# Patient Record
Sex: MALE | Race: WHITE | Employment: UNEMPLOYED | URBAN - METROPOLITAN AREA
[De-identification: names, ages, dates, MRNs, and addresses within clinical notes are randomized per-mention and may not be internally consistent; named-entity substitution may affect disease eponyms.]

---

## 2021-11-29 ENCOUNTER — APPOINTMENT (OUTPATIENT)
Dept: CT IMAGING | Facility: HOSPITAL | Age: 61
DRG: 673 | End: 2021-11-29
Attending: EMERGENCY MEDICINE
Payer: MEDICAID

## 2021-11-29 ENCOUNTER — HOSPITAL ENCOUNTER (INPATIENT)
Facility: HOSPITAL | Age: 61
LOS: 8 days | Discharge: HOME | DRG: 673 | End: 2021-12-08
Attending: EMERGENCY MEDICINE | Admitting: HOSPITALIST
Payer: MEDICAID

## 2021-11-29 DIAGNOSIS — N13.30 HYDRONEPHROSIS, UNSPECIFIED HYDRONEPHROSIS TYPE: ICD-10-CM

## 2021-11-29 DIAGNOSIS — E83.51 HYPOCALCEMIA: ICD-10-CM

## 2021-11-29 DIAGNOSIS — N17.9 AKI (ACUTE KIDNEY INJURY) (HCC): ICD-10-CM

## 2021-11-29 DIAGNOSIS — R33.9 URINARY RETENTION: Primary | ICD-10-CM

## 2021-11-29 PROCEDURE — 80076 HEPATIC FUNCTION PANEL: CPT | Performed by: EMERGENCY MEDICINE

## 2021-11-29 PROCEDURE — 51702 INSERT TEMP BLADDER CATH: CPT

## 2021-11-29 PROCEDURE — 93010 ELECTROCARDIOGRAM REPORT: CPT | Performed by: EMERGENCY MEDICINE

## 2021-11-29 PROCEDURE — 87186 SC STD MICRODIL/AGAR DIL: CPT | Performed by: EMERGENCY MEDICINE

## 2021-11-29 PROCEDURE — 80048 BASIC METABOLIC PNL TOTAL CA: CPT | Performed by: EMERGENCY MEDICINE

## 2021-11-29 PROCEDURE — 81001 URINALYSIS AUTO W/SCOPE: CPT | Performed by: EMERGENCY MEDICINE

## 2021-11-29 PROCEDURE — 85025 COMPLETE CBC W/AUTO DIFF WBC: CPT | Performed by: EMERGENCY MEDICINE

## 2021-11-29 PROCEDURE — 87086 URINE CULTURE/COLONY COUNT: CPT | Performed by: EMERGENCY MEDICINE

## 2021-11-29 PROCEDURE — 93005 ELECTROCARDIOGRAM TRACING: CPT

## 2021-11-29 PROCEDURE — 83735 ASSAY OF MAGNESIUM: CPT | Performed by: INTERNAL MEDICINE

## 2021-11-29 PROCEDURE — 96361 HYDRATE IV INFUSION ADD-ON: CPT

## 2021-11-29 PROCEDURE — 96366 THER/PROPH/DIAG IV INF ADDON: CPT

## 2021-11-29 PROCEDURE — 96365 THER/PROPH/DIAG IV INF INIT: CPT

## 2021-11-29 PROCEDURE — 99285 EMERGENCY DEPT VISIT HI MDM: CPT

## 2021-11-29 PROCEDURE — 87077 CULTURE AEROBIC IDENTIFY: CPT | Performed by: EMERGENCY MEDICINE

## 2021-11-29 PROCEDURE — 74176 CT ABD & PELVIS W/O CONTRAST: CPT | Performed by: EMERGENCY MEDICINE

## 2021-11-29 RX ORDER — SODIUM CHLORIDE 9 MG/ML
INJECTION, SOLUTION INTRAVENOUS CONTINUOUS
Status: DISCONTINUED | OUTPATIENT
Start: 2021-11-29 | End: 2021-11-30

## 2021-11-30 ENCOUNTER — APPOINTMENT (OUTPATIENT)
Dept: GENERAL RADIOLOGY | Facility: HOSPITAL | Age: 61
DRG: 673 | End: 2021-11-30
Attending: HOSPITALIST
Payer: MEDICAID

## 2021-11-30 PROBLEM — N17.9 AKI (ACUTE KIDNEY INJURY) (HCC): Status: ACTIVE | Noted: 2021-11-30

## 2021-11-30 PROBLEM — N13.30 HYDRONEPHROSIS, UNSPECIFIED HYDRONEPHROSIS TYPE: Status: ACTIVE | Noted: 2021-11-30

## 2021-11-30 PROBLEM — E83.51 HYPOCALCEMIA: Status: ACTIVE | Noted: 2021-11-30

## 2021-11-30 PROBLEM — N17.9 AKI (ACUTE KIDNEY INJURY): Status: ACTIVE | Noted: 2021-11-30

## 2021-11-30 PROCEDURE — 85379 FIBRIN DEGRADATION QUANT: CPT | Performed by: HOSPITALIST

## 2021-11-30 PROCEDURE — 80069 RENAL FUNCTION PANEL: CPT | Performed by: INTERNAL MEDICINE

## 2021-11-30 PROCEDURE — 85610 PROTHROMBIN TIME: CPT | Performed by: HOSPITALIST

## 2021-11-30 PROCEDURE — 85025 COMPLETE CBC W/AUTO DIFF WBC: CPT | Performed by: HOSPITALIST

## 2021-11-30 PROCEDURE — 82306 VITAMIN D 25 HYDROXY: CPT | Performed by: INTERNAL MEDICINE

## 2021-11-30 PROCEDURE — 87493 C DIFF AMPLIFIED PROBE: CPT | Performed by: HOSPITALIST

## 2021-11-30 PROCEDURE — XW033G6 INTRODUCTION OF REGN-COV2 MONOCLONAL ANTIBODY INTO PERIPHERAL VEIN, PERCUTANEOUS APPROACH, NEW TECHNOLOGY GROUP 6: ICD-10-PCS | Performed by: INTERNAL MEDICINE

## 2021-11-30 PROCEDURE — 71045 X-RAY EXAM CHEST 1 VIEW: CPT | Performed by: HOSPITALIST

## 2021-11-30 PROCEDURE — 82330 ASSAY OF CALCIUM: CPT | Performed by: HOSPITALIST

## 2021-11-30 PROCEDURE — 82728 ASSAY OF FERRITIN: CPT | Performed by: HOSPITALIST

## 2021-11-30 PROCEDURE — 97166 OT EVAL MOD COMPLEX 45 MIN: CPT

## 2021-11-30 PROCEDURE — 83970 ASSAY OF PARATHORMONE: CPT | Performed by: INTERNAL MEDICINE

## 2021-11-30 PROCEDURE — 97530 THERAPEUTIC ACTIVITIES: CPT

## 2021-11-30 PROCEDURE — 97162 PT EVAL MOD COMPLEX 30 MIN: CPT

## 2021-11-30 PROCEDURE — 86140 C-REACTIVE PROTEIN: CPT | Performed by: HOSPITALIST

## 2021-11-30 RX ORDER — CALCIUM ACETATE 667 MG/1
1 CAPSULE ORAL
Status: DISCONTINUED | OUTPATIENT
Start: 2021-11-30 | End: 2021-12-03

## 2021-11-30 RX ORDER — ENOXAPARIN SODIUM 100 MG/ML
40 INJECTION SUBCUTANEOUS DAILY
Status: DISCONTINUED | OUTPATIENT
Start: 2021-11-30 | End: 2021-11-30

## 2021-11-30 RX ORDER — HEPARIN SODIUM 5000 [USP'U]/ML
5000 INJECTION, SOLUTION INTRAVENOUS; SUBCUTANEOUS EVERY 12 HOURS SCHEDULED
Status: DISCONTINUED | OUTPATIENT
Start: 2021-12-01 | End: 2021-12-08

## 2021-11-30 RX ORDER — BISACODYL 10 MG
10 SUPPOSITORY, RECTAL RECTAL
Status: DISCONTINUED | OUTPATIENT
Start: 2021-11-30 | End: 2021-12-08

## 2021-11-30 RX ORDER — HYDROCODONE BITARTRATE AND ACETAMINOPHEN 5; 325 MG/1; MG/1
2 TABLET ORAL EVERY 4 HOURS PRN
Status: DISCONTINUED | OUTPATIENT
Start: 2021-11-30 | End: 2021-12-08

## 2021-11-30 RX ORDER — MELATONIN
3 NIGHTLY PRN
Status: DISCONTINUED | OUTPATIENT
Start: 2021-11-30 | End: 2021-12-08

## 2021-11-30 RX ORDER — SODIUM BICARBONATE 650 MG/1
650 TABLET ORAL 3 TIMES DAILY
Status: DISCONTINUED | OUTPATIENT
Start: 2021-11-30 | End: 2021-12-04

## 2021-11-30 RX ORDER — ACETAMINOPHEN 325 MG/1
650 TABLET ORAL EVERY 4 HOURS PRN
Status: DISCONTINUED | OUTPATIENT
Start: 2021-11-30 | End: 2021-12-08

## 2021-11-30 RX ORDER — VANCOMYCIN HYDROCHLORIDE 125 MG/1
125 CAPSULE ORAL EVERY 6 HOURS
Status: DISCONTINUED | OUTPATIENT
Start: 2021-11-30 | End: 2021-12-08

## 2021-11-30 RX ORDER — CALCITRIOL 0.25 UG/1
0.25 CAPSULE, LIQUID FILLED ORAL DAILY
Status: DISCONTINUED | OUTPATIENT
Start: 2021-11-30 | End: 2021-12-08

## 2021-11-30 RX ORDER — POLYETHYLENE GLYCOL 3350 17 G/17G
17 POWDER, FOR SOLUTION ORAL DAILY PRN
Status: DISCONTINUED | OUTPATIENT
Start: 2021-11-30 | End: 2021-12-08

## 2021-11-30 RX ORDER — HEPARIN SODIUM 5000 [USP'U]/ML
5000 INJECTION, SOLUTION INTRAVENOUS; SUBCUTANEOUS EVERY 12 HOURS SCHEDULED
Status: DISCONTINUED | OUTPATIENT
Start: 2021-11-30 | End: 2021-11-30

## 2021-11-30 RX ORDER — DOCUSATE SODIUM 100 MG/1
100 CAPSULE, LIQUID FILLED ORAL 2 TIMES DAILY
Status: DISCONTINUED | OUTPATIENT
Start: 2021-11-30 | End: 2021-11-30

## 2021-11-30 RX ORDER — HYDROCODONE BITARTRATE AND ACETAMINOPHEN 5; 325 MG/1; MG/1
1 TABLET ORAL EVERY 4 HOURS PRN
Status: DISCONTINUED | OUTPATIENT
Start: 2021-11-30 | End: 2021-12-08

## 2021-11-30 RX ORDER — SODIUM CHLORIDE 9 MG/ML
INJECTION, SOLUTION INTRAVENOUS CONTINUOUS
Status: DISCONTINUED | OUTPATIENT
Start: 2021-11-30 | End: 2021-12-04

## 2021-11-30 RX ORDER — ACETAMINOPHEN 325 MG/1
650 TABLET ORAL EVERY 6 HOURS PRN
Status: DISCONTINUED | OUTPATIENT
Start: 2021-11-30 | End: 2021-12-08

## 2021-11-30 RX ORDER — ERGOCALCIFEROL 1.25 MG/1
50000 CAPSULE ORAL
Status: DISCONTINUED | OUTPATIENT
Start: 2021-11-30 | End: 2021-12-08

## 2021-11-30 NOTE — CONSULTS
[facility] AND CLINICS  Report of Urology Consultation    Jose Maria Anderson Patient Status:  Inpatient    1960 MRN [id number]   Location [facility]5W Attending Ryan Birmingham MD   Hosp Day # 0 PCP Era Fields MD     Reason for Consultation: [START ON 12/1/2021] Heparin Sodium (Porcine) 5000 UNIT/ML injection 5,000 Units, 5,000 Units, Subcutaneous, 2 times per day  •  calcium gluconate 3 g in sodium chloride 0.9% 100 mL IVPB, 3 g, Intravenous, Once  •  calcitriol (ROCALTROL) cap 0.25 mcg, 0.25 catheter dependent in some form going forward. He may follow up with a urrutia as an outpatient to discuss intermittent catheterization or an SP tube in the future after any UTI has been treated and his renal failure has been addressed.  No further  interve

## 2021-11-30 NOTE — PLAN OF CARE
Problem: GENITOURINARY - ADULT  Goal: Absence of urinary retention  Description: INTERVENTIONS:  - Assess patient’s ability to void and empty bladder  - Monitor intake/output and perform bladder scan as needed  - Follow urinary retention protocol/standar assessment  - Modify environment to reduce risk of injury  - Provide assistive devices as appropriate  - Consider OT/PT consult to assist with strengthening/mobility  - Encourage toileting schedule  Outcome: Progressing     Problem: METABOLIC/FLUID AND ANTOINE

## 2021-11-30 NOTE — CONSULTS
[facility] HOSP - [facility]    Report of Consultation    Rosetta Monreal Patient Status:  Inpatient    1960 MRN [id number]   Location [facility]5W Attending Nani Grimes MD   Hosp Day # 0 PCP Ranjeet Shanks MD     Date of Admission:   10 mg, Rectal, Daily PRN  cefTRIAXone Sodium (ROCEPHIN) 1 g in sodium chloride 0.9% 100 mL IVPB-ADDV, 1 g, Intravenous, Q24H  [START ON 12/1/2021] Heparin Sodium (Porcine) 5000 UNIT/ML injection 5,000 Units, 5,000 Units, Subcutaneous, 2 times per day  calc ABDOMEN+PELVIS(CPT=74176)    Result Date: 11/30/2021  CONCLUSION:  1. Severe distention of the urinary bladder as well as severe bilateral hydronephrosis with marked renal cortical thinning. Findings are consistent with chronic bladder outlet obstruction. hyperparathyroidism  - Phoslo 667mg PO TID  - Calcitriol 0.25mcg PO Daily    Vitamin D deficiency:  - Vitamin D 50K units weekly. Hypocalcemia:  - calcium gluconate.     Elevated BP:  - From ISELA  - Can give hydralazine, start amlodipine if remains elevat

## 2021-11-30 NOTE — ED QUICK NOTES
Orders for admission, patient is aware of plan and ready to go upstairs.  Any questions, please call ED RN Norina Lombard  at extension [phone]  Type of COVID test sent: Rapid-COVID+  COVID Suspicion level: High    Titratable drug(s) infusing: SL  Rate:    LOC at time

## 2021-11-30 NOTE — CONSULTS
[address] [facility] Infectious Disease  Report of Consultation    Daphne Akins Patient Status:  Inpatient    1960 MRN [id number]   Location [facility]5W Attending Low Aguilar MD   Hosp Day # 0 PCP Talha Roque polyethylene glycol (PEG 3350) (MIRALAX) powder packet 17 g, 17 g, Oral, Daily PRN  •  bisacodyl (DULCOLAX) rectal suppository 10 mg, 10 mg, Rectal, Daily PRN  •  cefTRIAXone Sodium (ROCEPHIN) 1 g in sodium chloride 0.9% 100 mL IVPB-ADDV, 1 g, Intravenous, (1.702 m) 164 lb 8 oz (74.6 kg)   11/30/21 0000 (!) 132/99 — — 107 17 97 % — —   11/29/21 2330 (!) 138/98 — — 111 20 95 % — —   11/29/21 2220 (!) 152/109 — — 111 17 98 % — —   11/29/21 1812 — — — — — — 5' 7\" (1.702 m) 170 lb (77.1 kg)   11/29/21 1807 (!) evaluation with pre and post-contrast MRI recommended. 4. Findings were described by [facility]. Assessment and Plan:    1.   Incidental COVID+ status in this patient who presented with weakness and obstructive uropathy  - No pulmonary symptoms

## 2021-11-30 NOTE — PHYSICAL THERAPY NOTE
PHYSICAL THERAPY EVALUATION - INPATIENT     Room Number: 652/639-Y  Evaluation Date: 11/30/2021  Type of Evaluation: Initial   Physician Order: PT Eval and Treat    Presenting Problem: urinary retention; ISELA; decreased appetite, abdominal pain; (+) COVID- preparation for discharge. DISCHARGE RECOMMENDATIONS  PT Discharge Recommendations:  (Acute Rehab vs. Sub-Acute Rehab)    PLAN  PT Treatment Plan: Bed mobility;Transfer training;Gait training;Family education;Patient education; Energy conservation; Endura BEARING RESTRICTION                PAIN ASSESSMENT             COGNITION  · Overall Cognitive Status:  Impaired  · Following Commands:  follows one step commands consistently, follows multistep commands with increased time and follows multistep commands wi chair;Needs met;Call light within reach;RN aware of session/findings; All patient questions and concerns addressed; Alarm set; Discussed recommendations with /    CURRENT GOALS    Goals to be met by: 12/14/21  Patient Goal Patient's s

## 2021-11-30 NOTE — H&P
[facility] Hospitalist H&P       CC: Patient presents with:  Abdominal Pain       PCP: Blanca Mullins MD    History of Present Illness: Patient is a 62year old male with PMH sig for fibromyalgia, but hasn't seen a physician in many years, pr Abdomen:   Soft, lower abd pain, no rebound or guarding   Extremities: Extremities normal, atraumatic,     Skin: Skin color, texture, turgor normal. No rashes or lesions.     Neurologic: Normal strength, no focal deficit appreciated     Diagnostic Data: monitor renal function BMP  - continue sodium bicarb per renal TID    Severe Bilateral hydronephrosis / BPH / distended bladder  - continue urrutia   - urology consulted, discussed with them, unlikely that bladder will recover, may need chronic urrutia vs CIC

## 2021-11-30 NOTE — OCCUPATIONAL THERAPY NOTE
OCCUPATIONAL THERAPY EVALUATION - INPATIENT     Room Number: 282/819-I  Evaluation Date: 11/30/2021  Type of Evaluation: Initial       Physician Order: IP Consult to Occupational Therapy  Reason for Therapy: ADL/IADL Dysfunction and Discharge Planning    O safety. OT facilitated clock drawing, a tool used to detect cognitive impairment and need for further cognitive testing. It allows assessment of cognitive function, memory, language comprehension, visual-motor skills, and executive function.  Patient ac drives. However, over the last 2 weeks - 1 month , pt stopped driving and has not been feeling well at home.  Pt not very specific about why he stopped driving       SUBJECTIVE  Agreeable to activity   \"No one reads clocks anymore\"     OCCUPATIONAL THE transfer with SPV  Comment:     Patient will complete toileting with SPV  Comment:     Patient will tolerate standing for 4 minutes in prep for adls with SPV   Comment:    Patient will complete item retrieval with SPV  Comment:          Goals  on: 12

## 2021-11-30 NOTE — ED INITIAL ASSESSMENT (HPI)
Patient was seen at [address] [place] [address] and sent to ED. Patient states it feels like his \"nerves are firing\", poor appetite, abdominal pain, and diarrhea. Patient sent from [address] [place] [address] for abnormal EKG.

## 2021-11-30 NOTE — CM/SW NOTE
11/30/21 1000   CM/SW Referral Data   Referral Source Physician   Reason for Referral Discharge planning   Informant Patient   Pertinent Medical Hx   Does patient have an established PCP?  Yes   Patient Info   Patient's Current Mental Status at Time of A

## 2021-11-30 NOTE — ED PROVIDER NOTES
Patient Seen in: [facility] AND [facility] Emergency Department      History   Patient presents with:  Abdominal Pain    Stated Complaint: multiple complaints    Subjective:   HPI    56-year-old male with no known past medical history presents to the emergency d SpO2 97 %   O2 Device None (Room air)       Current:BP (!) 152/109   Pulse 111   Temp 98.4 °F (36.9 °C) (Oral)   Resp 17   Ht 170.2 cm (5' 7\")   Wt 77.1 kg   SpO2 98%   BMI 26.63 kg/m²         Physical Exam    Physical Exam   Constitutional: AAOx3, well components:    RBC 2.46 (*)     HGB 7.5 (*)     HCT 24.0 (*)     Lymphocyte Absolute 0.43 (*)     All other components within normal limits   MAGNESIUM - Normal   CBC WITH DIFFERENTIAL WITH PLATELET    Narrative:      The following orders were created for p imaged lung bases. A few additional marker nodules and ground glass opacities are identified within the right lung base, favored infectious/inflammatory. No pleural effusion.     Incidentally noted 2.4 cm indeterminate density lesion within the right hepa Disposition and Plan     Clinical Impression:  Urinary retention  (primary encounter diagnosis)  Hydronephrosis, unspecified hydronephrosis type  ISELA (acute kidney injury) ([facility] [address]  Hypocalcemia     Disposition:  Admit  11/29/2021 10:54 pm    F

## 2021-12-01 PROCEDURE — 85018 HEMOGLOBIN: CPT | Performed by: INTERNAL MEDICINE

## 2021-12-01 PROCEDURE — 86900 BLOOD TYPING SEROLOGIC ABO: CPT | Performed by: HOSPITALIST

## 2021-12-01 PROCEDURE — 85025 COMPLETE CBC W/AUTO DIFF WBC: CPT | Performed by: HOSPITALIST

## 2021-12-01 PROCEDURE — 85060 BLOOD SMEAR INTERPRETATION: CPT | Performed by: HOSPITALIST

## 2021-12-01 PROCEDURE — 85379 FIBRIN DEGRADATION QUANT: CPT | Performed by: HOSPITALIST

## 2021-12-01 PROCEDURE — 84100 ASSAY OF PHOSPHORUS: CPT | Performed by: INTERNAL MEDICINE

## 2021-12-01 PROCEDURE — 86140 C-REACTIVE PROTEIN: CPT | Performed by: HOSPITALIST

## 2021-12-01 PROCEDURE — 85014 HEMATOCRIT: CPT | Performed by: INTERNAL MEDICINE

## 2021-12-01 PROCEDURE — 80053 COMPREHEN METABOLIC PANEL: CPT | Performed by: HOSPITALIST

## 2021-12-01 PROCEDURE — 87340 HEPATITIS B SURFACE AG IA: CPT | Performed by: INTERNAL MEDICINE

## 2021-12-01 PROCEDURE — 86901 BLOOD TYPING SEROLOGIC RH(D): CPT | Performed by: HOSPITALIST

## 2021-12-01 PROCEDURE — 84466 ASSAY OF TRANSFERRIN: CPT | Performed by: HOSPITALIST

## 2021-12-01 PROCEDURE — 87040 BLOOD CULTURE FOR BACTERIA: CPT | Performed by: HOSPITALIST

## 2021-12-01 PROCEDURE — 86920 COMPATIBILITY TEST SPIN: CPT

## 2021-12-01 PROCEDURE — 83735 ASSAY OF MAGNESIUM: CPT | Performed by: HOSPITALIST

## 2021-12-01 PROCEDURE — 85610 PROTHROMBIN TIME: CPT | Performed by: HOSPITALIST

## 2021-12-01 PROCEDURE — 83540 ASSAY OF IRON: CPT | Performed by: HOSPITALIST

## 2021-12-01 PROCEDURE — 82728 ASSAY OF FERRITIN: CPT | Performed by: HOSPITALIST

## 2021-12-01 PROCEDURE — 86850 RBC ANTIBODY SCREEN: CPT | Performed by: HOSPITALIST

## 2021-12-01 PROCEDURE — 30233N1 TRANSFUSION OF NONAUTOLOGOUS RED BLOOD CELLS INTO PERIPHERAL VEIN, PERCUTANEOUS APPROACH: ICD-10-PCS | Performed by: HOSPITALIST

## 2021-12-01 PROCEDURE — 36430 TRANSFUSION BLD/BLD COMPNT: CPT

## 2021-12-01 RX ORDER — AMOXICILLIN AND CLAVULANATE POTASSIUM 500; 125 MG/1; MG/1
500 TABLET, FILM COATED ORAL DAILY
Status: DISCONTINUED | OUTPATIENT
Start: 2021-12-01 | End: 2021-12-08

## 2021-12-01 RX ORDER — MAGNESIUM SULFATE 1 G/100ML
1 INJECTION INTRAVENOUS ONCE
Status: COMPLETED | OUTPATIENT
Start: 2021-12-01 | End: 2021-12-01

## 2021-12-01 RX ORDER — SODIUM CHLORIDE 9 MG/ML
INJECTION, SOLUTION INTRAVENOUS ONCE
Status: COMPLETED | OUTPATIENT
Start: 2021-12-01 | End: 2021-12-02

## 2021-12-01 NOTE — DIETARY NOTE
ADULT NUTRITION INITIAL ASSESSMENT    Pt is at moderate nutrition risk. Pt does not meet malnutrition criteria.       RECOMMENDATIONS TO MD:  See Nutrition Intervention    ADMITTING DIAGNOSIS:   Hypocalcemia [E83.51]  Urinary retention [R33.9]  ISELA (acute Oral Daily   • ergocalciferol  50,000 Units Oral Q7 Days   • calcium acetate  1 capsule Oral TID CC   • sodium bicarbonate  650 mg Oral TID   • vancomycin  125 mg Oral Q6H     LABS: reviewed  Recent Labs     11/29/21 2036 11/29/21 2036 11/30/21  0506 11/ supplements)  - Meals and snacks: Encouraged adequate PO intake  - Medical Food Supplements-RD added Ensure Clear (240 calories/ 8 g protein each) Daily Apple and Nepro (425 calories/ 19 g protein each) Daily Vanilla.  Rational/use of oral supplements discu

## 2021-12-01 NOTE — PAYOR COMM NOTE
--------------  ADMISSION REVIEW     Payor: [address] [place] [address]  Subscriber #:  [id number]  Authorization Number: [id number]    Admit date: 11/30/21  Admit time: 12:23 AM       History   HPI  81-year-old male with no known past medica reducible hernia that is nontender. Soft. Bowel sounds are normal.   Musculoskeletal: Normal range of motion. No deformity. Lymphadenopathy: No sig cervical LAD   Neurological: Awake, alert. Normal reflexes. No cranial nerve deficit.     Skin: Skin is war No pleural effusion. Incidentally noted 2.4 cm indeterminate density lesion within the right hepatic dome (series 3, image 9). Correlate with prior workup and if unavailable recommend nonemergent evaluation with multiphasic contrast enhanced CT or MRI. No rashes or lesions.     Neurologic: Normal strength, no focal deficit appreciated     Lab 11/29/21 2036 11/30/21  0506   WBC 5.2 4.4   HGB 7.5* 7.7*   MCV 97.6 95.6   .0 155.0   INR  --  1.27*     Lab 11/29/21 2036 11/30/21  0506    139   K with urology as outpatient for further assessment    Covid PNA  - cough and congestion the past 2 days, + on 11/29  - has received 1 dose of vaccine in aug  - CXR with bilateral PNA  - not hypoxic  - inflammatory markers mildly elevated  - ID consulted to (L) 12/01/2021     INR 1.29 (H) 12/01/2021     DDIMER 1.91 (H) 12/01/2021     CRP 4.22 (H) 12/01/2021     MG 1.5 (L) 12/01/2021     PHOS 8.6 (H) 12/01/2021      Assessment and Plan:   62year old male with no known medical history, here with ISELA and bilate 60 ml     Date Action Dose Route     11/30/2021 1707 New Bag (none) Intravenous       cefTRIAXone Sodium (ROCEPHIN) 1 g in sodium chloride 0.9% 100 mL IVPB-ADDV     Date Action Dose Route     12/1/2021 0200 New Bag 1 g Intravenous       sodium bicarbonate

## 2021-12-01 NOTE — PHYSICAL THERAPY NOTE
Pt was to be seen for PT treatment session. Pt's HGB is 6.8 and he is currently receiving blood. Will re-attempt time-permitting when appropriate to see pt.

## 2021-12-01 NOTE — PROGRESS NOTES
[address] [facility] Infectious Disease  Progress Note    Oleg Stanford Patient Status:  Inpatient    1960 MRN [id number]   Location [facility]5 [facility] Attending Juan Ramirez MD   Hosp Day # 1 PCP Amanda Tse MD Indeterminate 2.6 cm low-attenuation lesion in the dome of the right lobe of the liver.  Further evaluation with pre and post-contrast MRI recommended. 4. Findings were described by [facility].      Assessment and Plan:     1.   Incidental COVID+ st

## 2021-12-01 NOTE — PROGRESS NOTES
[facility]    Progress Note    Stacey Talbert Patient Status:  Inpatient    1960 MRN [id number]   Location [address] [facility] Attending Alfonso Bernheim, MD   Hosp Day # 1 PCP Bee Stephens MD       Subjective:     Improved na by [facility].     Dictated by (CST): Britt Brush MD on 11/30/2021 at 6:48 AM     Finalized by (CST): Josiane Brush MD on 11/30/2021 at 6:55 AM          XR CHEST AP PORTABLE  (CPT=71045)    Result Date: 11/30/2021  CONCLUSION:  1 urology.     COVID-19 PNA:  - As per primary/ID     Liver mass:  - As per primary.     Anemia:  - Transfusion today  - Will likely need epogen     Thank you for allowing me to participate in the care of your patient.     Discussed with Brother Rand Vera

## 2021-12-01 NOTE — OCCUPATIONAL THERAPY NOTE
Attempted OT tx session. Hemoglobin is subtherapeutic (6.8) and pt undergoing blood transfusion. Will reattempt as able.     Chai Core   Occupational Therapist  [facility]

## 2021-12-02 ENCOUNTER — APPOINTMENT (OUTPATIENT)
Dept: INTERVENTIONAL RADIOLOGY/VASCULAR | Facility: HOSPITAL | Age: 61
DRG: 673 | End: 2021-12-02
Attending: INTERNAL MEDICINE
Payer: MEDICAID

## 2021-12-02 ENCOUNTER — APPOINTMENT (OUTPATIENT)
Dept: ULTRASOUND IMAGING | Facility: HOSPITAL | Age: 61
DRG: 673 | End: 2021-12-02
Attending: INTERNAL MEDICINE
Payer: MEDICAID

## 2021-12-02 PROCEDURE — 02HV33Z INSERTION OF INFUSION DEVICE INTO SUPERIOR VENA CAVA, PERCUTANEOUS APPROACH: ICD-10-PCS | Performed by: RADIOLOGY

## 2021-12-02 PROCEDURE — 85025 COMPLETE CBC W/AUTO DIFF WBC: CPT | Performed by: INTERNAL MEDICINE

## 2021-12-02 PROCEDURE — 83735 ASSAY OF MAGNESIUM: CPT | Performed by: INTERNAL MEDICINE

## 2021-12-02 PROCEDURE — 80069 RENAL FUNCTION PANEL: CPT | Performed by: INTERNAL MEDICINE

## 2021-12-02 PROCEDURE — 90935 HEMODIALYSIS ONE EVALUATION: CPT

## 2021-12-02 PROCEDURE — 5A1D70Z PERFORMANCE OF URINARY FILTRATION, INTERMITTENT, LESS THAN 6 HOURS PER DAY: ICD-10-PCS | Performed by: INTERNAL MEDICINE

## 2021-12-02 PROCEDURE — 85379 FIBRIN DEGRADATION QUANT: CPT | Performed by: HOSPITALIST

## 2021-12-02 PROCEDURE — 36558 INSERT TUNNELED CV CATH: CPT

## 2021-12-02 PROCEDURE — 85610 PROTHROMBIN TIME: CPT | Performed by: HOSPITALIST

## 2021-12-02 PROCEDURE — 77001 FLUOROGUIDE FOR VEIN DEVICE: CPT

## 2021-12-02 PROCEDURE — 82728 ASSAY OF FERRITIN: CPT | Performed by: HOSPITALIST

## 2021-12-02 PROCEDURE — 0JH63XZ INSERTION OF TUNNELED VASCULAR ACCESS DEVICE INTO CHEST SUBCUTANEOUS TISSUE AND FASCIA, PERCUTANEOUS APPROACH: ICD-10-PCS | Performed by: RADIOLOGY

## 2021-12-02 PROCEDURE — 93970 EXTREMITY STUDY: CPT | Performed by: INTERNAL MEDICINE

## 2021-12-02 PROCEDURE — 86140 C-REACTIVE PROTEIN: CPT | Performed by: HOSPITALIST

## 2021-12-02 RX ORDER — CEFAZOLIN SODIUM/WATER 2 G/20 ML
SYRINGE (ML) INTRAVENOUS
Status: COMPLETED
Start: 2021-12-02 | End: 2021-12-02

## 2021-12-02 RX ORDER — HEPARIN SODIUM 1000 [USP'U]/ML
INJECTION, SOLUTION INTRAVENOUS; SUBCUTANEOUS
Status: DISPENSED
Start: 2021-12-02 | End: 2021-12-03

## 2021-12-02 RX ORDER — LIDOCAINE HYDROCHLORIDE 20 MG/ML
INJECTION, SOLUTION EPIDURAL; INFILTRATION; INTRACAUDAL; PERINEURAL
Status: COMPLETED
Start: 2021-12-02 | End: 2021-12-02

## 2021-12-02 RX ORDER — HEPARIN SODIUM 1000 [USP'U]/ML
INJECTION, SOLUTION INTRAVENOUS; SUBCUTANEOUS
Status: COMPLETED
Start: 2021-12-02 | End: 2021-12-02

## 2021-12-02 NOTE — PLAN OF CARE
Problem: Patient Centered Care  Goal: Patient preferences are identified and integrated in the patient's plan of care  Description: Interventions:  - What would you like us to know as we care for you?  I'm from home with girlfriend  - Provide timely, comp METABOLIC/FLUID AND ELECTROLYTES - ADULT  Goal: Electrolytes maintained within normal limits  Description: INTERVENTIONS:  - Monitor labs and rhythm and assess patient for signs and symptoms of electrolyte imbalances  - Administer electrolyte replacement a appropriate pain scale  - Administer analgesics based on type and severity of pain and evaluate response  - Implement non-pharmacological measures as appropriate and evaluate response  - Consider cultural and social influences on pain and pain management

## 2021-12-02 NOTE — OCCUPATIONAL THERAPY NOTE
Attempt made for occupational therapy treatment. Pt is currently off unit at IR procedure, then scheduled for dialysis. Will follow-up tomorrow as pt is appropriate.

## 2021-12-02 NOTE — PROGRESS NOTES
[address] [facility] Infectious Disease  Progress Note    Mirela Su Patient Status:  Inpatient    1960 MRN [id number]   Location [facility] Attending Tamara Workman MD   Hosp Day # 2 PCP Mikey John MD (likely due to evolving renal failure) and he was concerned it was due to the vaccine so he never got a 2nd dose  - No pulmonary symptoms  - Received a single dose of COVID vaccine in August  - s/p Regen-CoV 11/30/21     2.  Obstructive uropathy with acute

## 2021-12-02 NOTE — PROGRESS NOTES
[facility] Hospitalist Progress Note   Progress Note    Chief Complaint:  Patient presents with:  Abdominal Pain     Subjective:  Doing OK today   Had a loose BM last night and it was sent for C diff and positive although says he had a normal BM today   Hgb < mmol/L    CO2 16.0 (L) 21.0 - 32.0 mmol/L    Anion Gap 14 0 - 18 mmol/L     (H) 7 - 18 mg/dL    Creatinine 16.80 (H) 0.70 - 1.30 mg/dL    BUN/CREA Ratio 7.7 (L) 10.0 - 20.0    Calcium, Total 6.2 (L) 8.5 - 10.1 mg/dL    Calculated Osmolality 332 (H) Lymphocyte Absolute 0.26 (L) 1.00 - 4.00 x10(3) uL    Monocyte Absolute 0.27 0.10 - 1.00 x10(3) uL    Eosinophil Absolute 0.01 0.00 - 0.70 x10(3) uL    Basophil Absolute 0.02 0.00 - 0.20 x10(3) uL    Immature Granulocyte Absolute 0.02 0.00 - 1.00 x10(3) uL Collection Time: 12/01/21  8:36 AM   Result Value Ref Range    Blood Product [id number]     Unit Number [id number]     UNIT ABO A     UNIT RH POS     Product Status Issued     Expiration Date [id number]     Blood Type Barcode 6200    Hemoglobin & H reduction was used. Dose information is     transmitted to the [facility] [employer] of Radiology) NRDR ([address]) which includes the Dose Index Registry.          FINDINGS:     LIVER: 2.6 cm low-attenuation lesion in the dome o at 6:48 AM             Finalized by (CST): Gayathri Brush MD on 11/30/2021 at 6:55 AM                  Assessment and Plan  Patient is a 62year old male with PMH sig for fibromyalgia, but hasn't seen a physician in many years, presents with Alphonso Howell up     Hypocalcemia   - s/p calcium gluconate, now on calcium acetate     Hyperphosphatemia  - per Renal     FN:  - IVF:125/hr  - Diet: adv     DVT Prophy:scd, heparin  Lines: PIV     Dispo: pending clinical course     Pt does not have a PCP but plans to e

## 2021-12-02 NOTE — PHYSICAL THERAPY NOTE
Attempted to see patient for physical therapy session. Patient having tunneled catheter placed and then is going to HD. Will attempt to see patient tomorrow for physical therapy session as time permits. RN aware and agreeable.

## 2021-12-02 NOTE — PROGRESS NOTES
[facility] Hospitalist Progress Note   Progress Note    Chief Complaint:  Patient presents with:  Abdominal Pain     Subjective:  Doing OK today  No respiratory issues other than occasional cough   BUN / Cr not significantly better. Renal planning dialysis. Value Ref Range    PT 14.3 11.8 - 14.5 seconds    INR 1.13 0.90 - 1.20   D-Dimer    Collection Time: 12/02/21  5:31 AM   Result Value Ref Range    D-Dimer 2.38 (H) <0.59 ug/mL FEU   C-Reactive Protein    Collection Time: 12/02/21  5:31 AM   Result Value Re Lymphocyte % 6.9 %    Monocyte % 5.6 %    Eosinophil % 1.1 %    Basophil % 0.4 %    Immature Granulocyte % 1.1 %   Scan slide    Collection Time: 12/02/21  5:31 AM   Result Value Ref Range    Slide Review       Slide reviewed, normal WBC, RBC, and plate Radiology Data Registry) which includes the Dose Index Registry. FINDINGS:     LIVER: 2.6 cm low-attenuation lesion in the dome of the right lobe of the     liver. GALLBLADDER: Normal size and appearance.     BILIARY: No visible dilatation or deidra TUNNELED CV CATH INSERTION EXCHANGE CHECK    (Results Pending)  US VENOUS DOPPLER LEG BILAT - DIAG IMG (CPT=93970)    (Results Pending)     Assessment and Plan  Patient is a 62year old male with PMH sig for fibromyalgia, but hasn't seen a physician in man significant diarrhea, some loose stool, normal BM today  - Started on vancomycin - plan to complete course.      Anemia  - iron studies more c/w anemia of chronic disease, may have component of iron deficiency as well; low iron and iron saturation, TIBC lo

## 2021-12-02 NOTE — PROGRESS NOTES
[facility]    Progress Note    Mirela Su Patient Status:  Inpatient    1960 MRN [id number]   Location [facility]5W Attending Tamara Workman MD   Hosp Day # 2 PCP Mikey John MD       Subjective:     Feels ok at vitamin D I do suspect that this is chronic and low likelihood of renal recovery. - Will start on RRT today  - tunneled catheter placement today.   - Risk and benefits of RRT discussed with Patient and his brother juan.  - PLACE REFERRAL TO [facility]

## 2021-12-02 NOTE — PLAN OF CARE
Patient is alert and oriented x4. Martinez catheter in placed, draining pink urine, no clots. IVF infusing. Call light within reach. Bed alarm on. Isolation precautions maintained. Hourly rounding maintained.   Problem: Patient Centered Care  Goal: Patient pre excess or deficit  - Monitor intake, output and patient weight  - Monitor urine specific gravity, serum osmolarity and serum sodium as indicated or ordered  - Monitor response to interventions for patient's volume status, including labs, urine output, bloo based on assessment  - Modify environment to reduce risk of injury  - Provide assistive devices as appropriate  - Consider OT/PT consult to assist with strengthening/mobility  - Encourage toileting schedule  Outcome: Progressing

## 2021-12-02 NOTE — CM/SW NOTE
Thursday 12/2/2021    Per Nephrology, plan to initiate RRT today, tunneled catheter placement today. Upon request from Leonila Cehw MD, BRYAN has initiated [place] [facility].  BRYAN called [facility] p: [phone] and spoke with Sridhar Myers DCSS.    @1:30PM  SW received call from Lady Gamboa stating pt has been financially and clinically approved for [facility]. Per ID, pt will be out of isolation on 12/9. Per Lady Gamboa, pt cannot start at [facility] until out of isolation. Kristine Ramírez  1st Treatment Date & Time: 12/10/2021 2:30 PM  Regular Treatment Days & Time: [facility] 3:15PM     PLAN: pending medical course, pt to DC after HD on Wednesday and start care at [facility] on Friday 12/10/21    BRYAN gan

## 2021-12-03 PROCEDURE — 97530 THERAPEUTIC ACTIVITIES: CPT

## 2021-12-03 PROCEDURE — 87086 URINE CULTURE/COLONY COUNT: CPT | Performed by: INTERNAL MEDICINE

## 2021-12-03 PROCEDURE — 81001 URINALYSIS AUTO W/SCOPE: CPT | Performed by: INTERNAL MEDICINE

## 2021-12-03 PROCEDURE — 83735 ASSAY OF MAGNESIUM: CPT | Performed by: INTERNAL MEDICINE

## 2021-12-03 PROCEDURE — 80069 RENAL FUNCTION PANEL: CPT | Performed by: INTERNAL MEDICINE

## 2021-12-03 PROCEDURE — 82728 ASSAY OF FERRITIN: CPT | Performed by: HOSPITALIST

## 2021-12-03 PROCEDURE — 85025 COMPLETE CBC W/AUTO DIFF WBC: CPT | Performed by: INTERNAL MEDICINE

## 2021-12-03 PROCEDURE — 85379 FIBRIN DEGRADATION QUANT: CPT | Performed by: HOSPITALIST

## 2021-12-03 PROCEDURE — 82274 ASSAY TEST FOR BLOOD FECAL: CPT | Performed by: INTERNAL MEDICINE

## 2021-12-03 PROCEDURE — 86140 C-REACTIVE PROTEIN: CPT | Performed by: HOSPITALIST

## 2021-12-03 PROCEDURE — 97535 SELF CARE MNGMENT TRAINING: CPT

## 2021-12-03 RX ORDER — OXYBUTYNIN CHLORIDE 5 MG/1
5 TABLET, EXTENDED RELEASE ORAL DAILY PRN
Status: DISCONTINUED | OUTPATIENT
Start: 2021-12-03 | End: 2021-12-08

## 2021-12-03 RX ORDER — TRAZODONE HYDROCHLORIDE 50 MG/1
50 TABLET ORAL NIGHTLY PRN
Status: DISCONTINUED | OUTPATIENT
Start: 2021-12-03 | End: 2021-12-08

## 2021-12-03 RX ORDER — LIDOCAINE HYDROCHLORIDE 20 MG/ML
JELLY TOPICAL AS NEEDED
Status: DISCONTINUED | OUTPATIENT
Start: 2021-12-03 | End: 2021-12-08

## 2021-12-03 NOTE — OCCUPATIONAL THERAPY NOTE
OCCUPATIONAL THERAPY TREATMENT NOTE - INPATIENT        Room Number: 177/829-D                Problem List  Principal Problem:    Urinary retention  Active Problems:    Hydronephrosis, unspecified hydronephrosis type    ISELA (acute kidney injury) ([facility] sit<>stands) that indicates a risk for falls. Pt will continue to benefit from IP OT services to maximize pt's strength, participation, and safety.        The patient's Approx Degree of Impairment: 46.65% has been calculated based on documentation in the AM sba    Bedroom Mobility: cga w/o AD      FUNCTIONAL ADL ASSESSMENT  Grooming: sba for standing balance at sink    Education Provided: role of OT, activity promotion  Patient End of Session: Up in chair;Needs met;Call light within reach;RN aware of session/

## 2021-12-03 NOTE — PLAN OF CARE
Patient is alert and oriented x4. Martinez catheter in placed, draining pink urine, no clots. IVF infusing. New right internal jugular in place, site WDL. Dialysis earlier, 0ml output. Reinforcement done regarding electrolyte and metabolic imbalance.  Call lig Hemodynamic stability and optimal renal function maintained  Description: INTERVENTIONS:  - Monitor labs and assess for signs and symptoms of volume excess or deficit  - Monitor intake, output and patient weight  - Monitor urine specific gravity, serum osm indicated by assessment.  - Educate pt/family on patient safety including physical limitations  - Instruct pt to call for assistance with activity based on assessment  - Modify environment to reduce risk of injury  - Provide assistive devices as appropriat

## 2021-12-03 NOTE — PROGRESS NOTES
[address] [facility] Infectious Disease  Progress Note    Abdias Vasquez Patient Status:  Inpatient    1960 MRN [id number]   Location [facility] Attending Ayesha Garcia MD   Hosp Day # 3 PCP Andree Crowder MD due to evolving renal failure) and he was concerned it was due to the vaccine so he never got a 2nd dose  - No pulmonary symptoms  - Received a single dose of COVID vaccine in August  - s/p Regen-CoV 11/30/21     2.  Obstructive uropathy with acute renal f

## 2021-12-03 NOTE — PROGRESS NOTES
[facility]    Progress Note    Jose Maria Anderson Patient Status:  Inpatient    1960 MRN [id number]   Location [facility]5W Attending Little Lamb MD   Hosp Day # 3 PCP Era Fields MD       Subjective:     Feels ok at uropathy  - CT abdomen with bilateral hydronephrosis and significant bladder distention  - No significant improvement in GFR with urrutia placement and IVF  - Continue [email or web address] Art  - given persistent anemia, hyperphosphatemia, elevated PTH and low vitamin D I

## 2021-12-03 NOTE — PROGRESS NOTES
[facility] Progress Note   Progress Note    Chief Complaint:  Patient presents with:  Abdominal Pain     Subjective:  Doing OK today  Did no sleep well last night and then had some mild confusion or disorientation which was distressing for him   [place] mg/dL   Ferritin    Collection Time: 12/03/21  5:12 AM   Result Value Ref Range    Ferritin 759.6 (H) 30.0 - 530.0 ng/mL   Renal Function Panel    Collection Time: 12/03/21  5:12 AM   Result Value Ref Range    Glucose 116 (H) 70 - 99 mg/dL    Sodium 141 13 Gravity 1.009 1.001 - 1.030    Glucose Urine 150  (A) Negative mg/dL    Bilirubin Urine Negative Negative    Ketones Urine Negative Negative mg/dL    Blood Urine Moderate (A) Negative    pH Urine 6.0 5.0 - 8.0    Protein Urine 100  (A) Negative mg/dL    Ur electronic medical record. DESCRIPTION: Fully informed consent was obtained from the patient after     discussion of risks, benefits, limitations and alternatives to dialysis     via catheter access.   Risks of catheter placement discussed include for dyspnea. COVID positive. TECHNIQUE:   Single view. FINDINGS:     CARDIAC/VASC: Normal.  No cardiac silhouette abnormality or cardiomegaly. Unremarkable pulmonary vasculature. MEDIAST/ALVIN:   No visible mass or adenopathy. defined mass or abnormal enlargement. KIDNEYS: There is severe bilateral hydronephrosis with marked cortical     thinning. Bilateral ureters are dilated to the UVJ. AORTA/VASCULAR:   Normal.  No aneurysm.     RETROPERITONEUM: No mass or enlarged ad session well, further HD per neprho.    - continue sodium bicarb per renal TID   - Had a few bladder spasms - monitor.      Severe Bilateral hydronephrosis / BPH / distended bladder  - continue urrutia   - urology consulted, Dr. Chris Styles d/w them, unlikely that b etiology  - will need MRI with contrast for further deliniation  - will defer to outpatient work up.  Pt aware.      Hypocalcemia   - s/p calcium gluconate, replace as needed, nephro following      Hyperphosphatemia  - per Renal    Insomnia   - melatonin di

## 2021-12-03 NOTE — PLAN OF CARE
Problem: Patient Centered Care  Goal: Patient preferences are identified and integrated in the patient's plan of care  Description: Interventions:  - What would you like us to know as we care for you?  I'm from home with girlfriend  - Provide timely, comp ordered  - Instruct patient on fluid and nutrition restrictions as appropriate  Outcome: Progressing  Goal: Hemodynamic stability and optimal renal function maintained  Description: INTERVENTIONS:  - Monitor labs and assess for signs and symptoms of volume cognitive and physical deficits and behaviors that affect risk of falls.   - [place] fall precautions as indicated by assessment.  - Educate pt/family on patient safety including physical limitations  - Instruct pt to call for assistance with activity bas

## 2021-12-04 PROCEDURE — 83735 ASSAY OF MAGNESIUM: CPT | Performed by: INTERNAL MEDICINE

## 2021-12-04 PROCEDURE — 85379 FIBRIN DEGRADATION QUANT: CPT | Performed by: INTERNAL MEDICINE

## 2021-12-04 PROCEDURE — 97116 GAIT TRAINING THERAPY: CPT

## 2021-12-04 PROCEDURE — 85025 COMPLETE CBC W/AUTO DIFF WBC: CPT | Performed by: INTERNAL MEDICINE

## 2021-12-04 PROCEDURE — 97110 THERAPEUTIC EXERCISES: CPT

## 2021-12-04 PROCEDURE — 90935 HEMODIALYSIS ONE EVALUATION: CPT

## 2021-12-04 PROCEDURE — 80048 BASIC METABOLIC PNL TOTAL CA: CPT | Performed by: INTERNAL MEDICINE

## 2021-12-04 RX ORDER — HEPARIN SODIUM 1000 [USP'U]/ML
INJECTION, SOLUTION INTRAVENOUS; SUBCUTANEOUS
Status: COMPLETED
Start: 2021-12-04 | End: 2021-12-04

## 2021-12-04 NOTE — PLAN OF CARE
Patient is alert and oriented x4. On room air, denies SOB, vitals stable. Educated patient on plan of care, general admission education, and heparin site injection. Call light within reach. Bed in lowest position. All needs addressed.  Hourly rounding mainta strengthening/mobility  - Encourage toileting schedule  Outcome: Progressing

## 2021-12-04 NOTE — PROGRESS NOTES
[facility]    Progress Note    Ana Rodgers Patient Status:  Inpatient    1960 MRN [id number]   Location [facility] Attending Nel Khan MD   Hosp Day # 4 PCP Moises Larkin MD       Subjective:     No new symp MD  12/1/2021    Assessment and Plan:   62year old male with no known medical history, here with ISELA and bilateral hydronephrosis:     ISELA:  - Secondary to obstructive uropathy  - CT abdomen with bilateral hydronephrosis and significant bladder distention

## 2021-12-04 NOTE — PHYSICAL THERAPY NOTE
PHYSICAL THERAPY TREATMENT NOTE - INPATIENT     Room Number: 129/980-Y       Presenting Problem: urinary retention; ISELA; decreased appetite, abdominal pain; (+) COVID-19       Problem List  Principal Problem:    Urinary retention  Active Problems:    Hydro have...   Patient Difficulty: Turning over in bed (including adjusting bedclothes, sheets and blankets)?: None   Patient Difficulty: Sitting down on and standing up from a chair with arms (e.g., wheelchair, bedside commode, etc.): None   Patient Difficulty:

## 2021-12-04 NOTE — PROGRESS NOTES
Sabina Carrillo is a 62year old male admitted with abdominal pain and incidentally found to be + for COVID,     HPI:    Patient in [facility],   Previous entries noted,   Physical exam deferred to conserve PPE  Talked to him on phone,   Diarrhea (A) Negative    WBC Urine >50 (A) 0 - 5 /HPF    RBC Urine 3-5 (A) 0 - 2 /HPF    Bacteria Urine 1+ (A) None Seen /HPF   Hepatic Function Panel (7)   Result Value Ref Range    AST 9 (L) 15 - 37 U/L    ALT 13 (L) 16 - 61 U/L    Alkaline Phosphatase 34 (L) 45 mmol/L    CO2 15.0 (L) 21.0 - 32.0 mmol/L    Anion Gap 15 0 - 18 mmol/L     (H) 7 - 18 mg/dL    Creatinine 17.20 (H) 0.70 - 1.30 mg/dL    BUN/CREA Ratio 7.6 (L) 10.0 - 20.0    Calcium, Total 7.0 (L) 8.5 - 10.1 mg/dL    Calculated Osmolality 329 (H) Result Value Ref Range    MD Blood Smear Consult         Evaluation of CBC with differential data and the peripheral blood smear demonstrates pancytopenia characterized by severe/critical normochromic normocytic anemia with decreased absolute RBC count, 1.30 mg/dL    BUN/CREA Ratio 8.1 (L) 10.0 - 20.0    Calcium, Total 6.3 (L) 8.5 - 10.1 mg/dL    Calculated Osmolality 331 (H) 275 - 295 mOsm/kg    GFR, Non- 3 (L) >=60    GFR, -American 4 (L) >=60    Albumin 2.2 (L) 3.4 - 5.0 g/dL Urine 6.0 5.0 - 8.0    Protein Urine 100  (A) Negative mg/dL    Urobilinogen Urine <2.0 <2.0    Nitrite Urine Negative Negative    Leukocyte Esterase Urine Trace (A) Negative    WBC Urine 1-5 0 - 5 /HPF    RBC Urine >10 (A) 0 - 2 /HPF    Bacteria Urine Non Culture    Specimen: Blood,peripheral   Result Value Ref Range    Blood Culture Result No Growth 3 Days    Urine Culture, Routine    Specimen: Urine, clean catch   Result Value Ref Range    Urine Culture No Growth at 18-24 hrs.     CBC W/ DIFFERENTIAL   Res %   CBC W/ DIFFERENTIAL   Result Value Ref Range    WBC 4.8 4.0 - 11.0 x10(3) uL    RBC 2.25 (L) 4.30 - 5.70 x10(6)uL    HGB 6.8 (LL) 13.0 - 17.5 g/dL    HCT 21.5 (L) 39.0 - 53.0 %    MCV 95.6 80.0 - 100.0 fL    MCH 30.2 26.0 - 34.0 pg    MCHC 31.6 31.0 - 30.4 26.0 - 34.0 pg    MCHC 32.5 31.0 - 37.0 g/dL    RDW-SD 45.1 35.1 - 46.3 fL    RDW 13.1 11.0 - 15.0 %    .0 (L) 150.0 - 450.0 10(3)uL    Neutrophil Absolute Prelim 4.55 1.50 - 7.70 x10 (3) uL    Neutrophil Absolute 4.55 1.50 - 7.70 x10(3) uL deficits. DERM:  Denies rashes. MUSCULOSKELETAL:  Denies joint pain, muscle pain. PSYCHIATRIC:  Denies feelings of anxiety or depression.     PHYSICAL EXAM:   /85 (BP Location: Right arm)   Pulse 99   Temp 98.5 °F (36.9 °C) (Oral)   Resp 18   Ht 5'

## 2021-12-04 NOTE — PROGRESS NOTES
[facility] Progress Note   Progress Note    Chief Complaint:  Patient presents with:  Abdominal Pain     Subjective:    No acute events. Current Medications:  No current outpatient medications on file.   I/O:    Intake/Output Summary (Last 24 Esterase Urine Trace (A) Negative    WBC Urine 1-5 0 - 5 /HPF    RBC Urine >10 (A) 0 - 2 /HPF    Bacteria Urine None Seen None Seen /HPF   Basic Metabolic Panel (8)    Collection Time: 12/04/21  5:19 AM   Result Value Ref Range    Glucose 99 70 - 99 mg/dL (CPT=93970)         COMPARISON: None. INDICATIONS: Elevated d-dimer. rule out DVT. Bilateral Lower extremity     pain and swelling.          TECHNIQUE: Color duplex Doppler venous ultrasound of both lower     extremities was performed in the     us jugular vein with real-time ultrasound     guidance, images recorded. A guidewire was passed centrally, tract     dilated, with placement of a peel-away sheath.            A small counter incision was made along the upper outer chest wall, and a     tunnel opacification suspicious of atypical     pneumonia.                    Dictated by (CST): Ariana Brush MD on 11/30/2021 at 9:23 AM             Finalized by (CST): Ariana Brush MD on 11/30/2021 at 9:25 AM               CT ABDOMEN+PELVIS enlarged measuring 4.6 x 6.3 x 6.0 cm. BONES:   No significant bony lesion or fracture. LUNG BASES: Dependant atelectasis and or scarring. OTHER: Negative.                          =====    CONCLUSION:     1.  Severe distention of the urinary blad D-Dimer  - in setting of covid infection   - No chest pain, no SOB, on room air  - No leg swelling or calf tenderness either   - Le dopplers neg for DVT  - In absence of any respiratory symptoms, suspicion for PE is low; would not get CTA with his renal fa

## 2021-12-05 PROCEDURE — 85025 COMPLETE CBC W/AUTO DIFF WBC: CPT | Performed by: INTERNAL MEDICINE

## 2021-12-05 PROCEDURE — 83735 ASSAY OF MAGNESIUM: CPT | Performed by: INTERNAL MEDICINE

## 2021-12-05 PROCEDURE — 80069 RENAL FUNCTION PANEL: CPT | Performed by: INTERNAL MEDICINE

## 2021-12-05 NOTE — PROGRESS NOTES
[facility]    Progress Note     Patient Status:  Inpatient    1960 MRN [id number]   Location [address] [facility] Attending Melissa Lopez MD   Hosp Day # 5 PCP Lupis Evans MD       Subjective:     No new symp suspect that this is chronic and low likelihood of renal recovery.   - tunneled catheter placement 12/2/2021 Dialysis initiated 12/2/2021  - Risk and benefits of RRT discussed with Patient and his brother juna.  - PLACE REFERRAL TO [facility]

## 2021-12-05 NOTE — PLAN OF CARE
Problem: Patient Centered Care  Goal: Patient preferences are identified and integrated in the patient's plan of care  Description: Interventions:  - What would you like us to know as we care for you?  I'm from home with girlfriend  - Provide timely, comp ordered  - Instruct patient on fluid and nutrition restrictions as appropriate  Outcome: Progressing  Goal: Hemodynamic stability and optimal renal function maintained  Description: INTERVENTIONS:  - Monitor labs and assess for signs and symptoms of volume cognitive and physical deficits and behaviors that affect risk of falls.   - [place] fall precautions as indicated by assessment.  - Educate pt/family on patient safety including physical limitations  - Instruct pt to call for assistance with activity bas

## 2021-12-05 NOTE — PLAN OF CARE
Problem: Patient Centered Care  Goal: Patient preferences are identified and integrated in the patient's plan of care  Description: Interventions:  - What would you like us to know as we care for you?  I'm from home with girlfriend  - Provide timely, comp ordered  - Instruct patient on fluid and nutrition restrictions as appropriate  Outcome: Progressing  Goal: Hemodynamic stability and optimal renal function maintained  Description: INTERVENTIONS:  - Monitor labs and assess for signs and symptoms of volume cognitive and physical deficits and behaviors that affect risk of falls.   - [place] fall precautions as indicated by assessment.  - Educate pt/family on patient safety including physical limitations  - Instruct pt to call for assistance with activity bas

## 2021-12-05 NOTE — PROGRESS NOTES
[facility] Progress Note   Progress Note    Chief Complaint:  Patient presents with:  Abdominal Pain     Subjective:    No acute events. Current Medications:  No current outpatient medications on file.   I/O:    Intake/Output Summary (Last 24 GFR, -American 10 (L) >=60    Albumin 2.4 (L) 3.4 - 5.0 g/dL    Phosphorus 4.0 2.5 - 4.9 mg/dL   Magnesium    Collection Time: 12/05/21  5:25 AM   Result Value Ref Range    Magnesium 1.7 1.6 - 2.6 mg/dL   CBC W/ DIFFERENTIAL    Collection Time: 12/0 venous thrombosis.                    Dictated by (CST): Geovanny Lynn MD on 12/02/2021 at 4:24 PM         Finalized by (CST): Geovanny Lynn MD on 12/02/2021 at 4:26 PM               IR TUNNELED CV CATH INSERTION EXCHANGE CHECK   Final Result applied. FINDINGS:     IMAGING: No sonographic or fluoroscopic abnormalities. VEIN ACCESSED: Right internal jugular. COMPLICATIONS: None.                          =====    CONCLUSION: Tunneled permacath placement for hemodialysis. technique for dose reduction was used. Dose information is     transmitted to the [facility] [employer] [facility]) NRDR ([address]) which includes the Dose Index Registry.          FINDINGS:     LIVER: 2.6 cm low-attenuation l MD AWA on 11/30/2021 at 6:48 AM             Finalized by (CST): Douglas Brush MD on 11/30/2021 at 6:55 AM                  Assessment and Plan  Patient is a 62year old male with PMH sig for fibromyalgia, but hasn't seen a physician in many years, chronic disease, may have component of iron deficiency as well; low iron and iron saturation, TIBC low as well, ferritin elevated but in the setting of covid / UTI   - received 1 unit PRBC on 12/1 for Hgb 6.8; monitor  - Transfuse for Hgb < 7   - OP GI wor

## 2021-12-05 NOTE — PROGRESS NOTES
Miguel Ángel Pop is a 62year old male admitted with abdominal pain and incidentally found to be + for COVID,     HPI:    Patient in [facility] isolation,   Previous entries noted,   Physical exam deferred to conserve PPE  Talked to him on phone,   Diarrhea (A) Negative    WBC Urine >50 (A) 0 - 5 /HPF    RBC Urine 3-5 (A) 0 - 2 /HPF    Bacteria Urine 1+ (A) None Seen /HPF   Hepatic Function Panel (7)   Result Value Ref Range    AST 9 (L) 15 - 37 U/L    ALT 13 (L) 16 - 61 U/L    Alkaline Phosphatase 34 (L) 45 mmol/L    CO2 15.0 (L) 21.0 - 32.0 mmol/L    Anion Gap 15 0 - 18 mmol/L     (H) 7 - 18 mg/dL    Creatinine 17.20 (H) 0.70 - 1.30 mg/dL    BUN/CREA Ratio 7.6 (L) 10.0 - 20.0    Calcium, Total 7.0 (L) 8.5 - 10.1 mg/dL    Calculated Osmolality 329 (H) Result Value Ref Range    MD Blood Smear Consult         Evaluation of CBC with differential data and the peripheral blood smear demonstrates pancytopenia characterized by severe/critical normochromic normocytic anemia with decreased absolute RBC count, 1.30 mg/dL    BUN/CREA Ratio 8.1 (L) 10.0 - 20.0    Calcium, Total 6.3 (L) 8.5 - 10.1 mg/dL    Calculated Osmolality 331 (H) 275 - 295 mOsm/kg    GFR, Non- 3 (L) >=60    GFR, -American 4 (L) >=60    Albumin 2.2 (L) 3.4 - 5.0 g/dL Urine 6.0 5.0 - 8.0    Protein Urine 100  (A) Negative mg/dL    Urobilinogen Urine <2.0 <2.0    Nitrite Urine Negative Negative    Leukocyte Esterase Urine Trace (A) Negative    WBC Urine 1-5 0 - 5 /HPF    RBC Urine >10 (A) 0 - 2 /HPF    Bacteria Urine Non Value Ref Range    Antibody Screen Negative    RAINBOW DRAW GOLD   Result Value Ref Range    Hold Gold Auto Resulted    Rapid SARS-CoV-2 by PCR    Specimen: Nares;  Other   Result Value Ref Range    Rapid SARS-CoV-2 by PCR Detected (A) Not Detected   Urine MCH 30.7 26.0 - 34.0 pg    MCHC 32.1 31.0 - 37.0 g/dL    RDW-SD 44.2 35.1 - 46.3 fL    RDW 12.6 11.0 - 15.0 %    .0 150.0 - 450.0 10(3)uL    Neutrophil Absolute Prelim 3.69 1.50 - 7.70 x10 (3) uL    Neutrophil Absolute 3.69 1.50 - 7.70 x10(3) uL Absolute 4.43 1.50 - 7.70 x10(3) uL    Lymphocyte Absolute 0.36 (L) 1.00 - 4.00 x10(3) uL    Monocyte Absolute 0.29 0.10 - 1.00 x10(3) uL    Eosinophil Absolute 0.06 0.00 - 0.70 x10(3) uL    Basophil Absolute 0.02 0.00 - 0.20 x10(3) uL    Immature Granuloc - 0.20 x10(3) uL    Immature Granulocyte Absolute 0.05 0.00 - 1.00 x10(3) uL    Neutrophil % 81.5 %    Lymphocyte % 7.5 %    Monocyte % 6.7 %    Eosinophil % 3.2 %    Basophil % 0.2 %    Immature Granulocyte % 0.9 %   CBC W/ DIFFERENTIAL   Result Value Ref abnormalities. Oropharynx clear, trachea ML. NECK:  Supple, no masses, no lymphadenopathy. LUNGS:  Clear to auscultation b/l, no rhonchi, rales, or wheezes. CARDIO: RRR W9/W1, no rubs, clicks, heaves, or murmurs.   GI:  Soft NT/ND, BS present x4 quadran

## 2021-12-05 NOTE — PLAN OF CARE
Patient is resting well overnight with no acute changes. Remains on air. Dialysis done. Reinforced education from dialysis nurse. No fluid removed.      Problem: Patient Centered Care  Goal: Patient preferences are identified and integrated in the patient's replacement as ordered  - Monitor response to electrolyte replacements, including rhythm and repeat lab results as appropriate  - Fluid restriction as ordered  - Instruct patient on fluid and nutrition restrictions as appropriate  Outcome: Margaux Dodson Progressing     Problem: SAFETY ADULT - FALL  Goal: Free from fall injury  Description: INTERVENTIONS:  - Assess pt frequently for physical needs  - Identify cognitive and physical deficits and behaviors that affect risk of falls.   - [place] fall precaut

## 2021-12-06 PROCEDURE — 85027 COMPLETE CBC AUTOMATED: CPT | Performed by: INTERNAL MEDICINE

## 2021-12-06 PROCEDURE — 86706 HEP B SURFACE ANTIBODY: CPT | Performed by: INTERNAL MEDICINE

## 2021-12-06 PROCEDURE — 83735 ASSAY OF MAGNESIUM: CPT | Performed by: INTERNAL MEDICINE

## 2021-12-06 PROCEDURE — 86850 RBC ANTIBODY SCREEN: CPT | Performed by: INTERNAL MEDICINE

## 2021-12-06 PROCEDURE — 97530 THERAPEUTIC ACTIVITIES: CPT

## 2021-12-06 PROCEDURE — 80500 HEPATITIS A B + C PROFILE: CPT | Performed by: INTERNAL MEDICINE

## 2021-12-06 PROCEDURE — 87340 HEPATITIS B SURFACE AG IA: CPT | Performed by: INTERNAL MEDICINE

## 2021-12-06 PROCEDURE — 86704 HEP B CORE ANTIBODY TOTAL: CPT | Performed by: INTERNAL MEDICINE

## 2021-12-06 PROCEDURE — 97116 GAIT TRAINING THERAPY: CPT

## 2021-12-06 PROCEDURE — 86708 HEPATITIS A ANTIBODY: CPT | Performed by: INTERNAL MEDICINE

## 2021-12-06 PROCEDURE — 86920 COMPATIBILITY TEST SPIN: CPT

## 2021-12-06 PROCEDURE — 86803 HEPATITIS C AB TEST: CPT | Performed by: INTERNAL MEDICINE

## 2021-12-06 PROCEDURE — 86901 BLOOD TYPING SEROLOGIC RH(D): CPT | Performed by: INTERNAL MEDICINE

## 2021-12-06 PROCEDURE — 80069 RENAL FUNCTION PANEL: CPT | Performed by: INTERNAL MEDICINE

## 2021-12-06 PROCEDURE — 86900 BLOOD TYPING SEROLOGIC ABO: CPT | Performed by: INTERNAL MEDICINE

## 2021-12-06 PROCEDURE — 90935 HEMODIALYSIS ONE EVALUATION: CPT

## 2021-12-06 PROCEDURE — 36430 TRANSFUSION BLD/BLD COMPNT: CPT

## 2021-12-06 PROCEDURE — 85018 HEMOGLOBIN: CPT | Performed by: INTERNAL MEDICINE

## 2021-12-06 RX ORDER — VANCOMYCIN HYDROCHLORIDE 125 MG/1
125 CAPSULE ORAL EVERY 6 HOURS
Qty: 28 CAPSULE | Refills: 0 | Status: SHIPPED | OUTPATIENT
Start: 2021-12-06 | End: 2022-01-26

## 2021-12-06 RX ORDER — HEPARIN SODIUM 1000 [USP'U]/ML
INJECTION, SOLUTION INTRAVENOUS; SUBCUTANEOUS
Status: COMPLETED
Start: 2021-12-06 | End: 2021-12-06

## 2021-12-06 RX ORDER — SODIUM CHLORIDE 9 MG/ML
INJECTION, SOLUTION INTRAVENOUS ONCE
Status: COMPLETED | OUTPATIENT
Start: 2021-12-06 | End: 2021-12-06

## 2021-12-06 NOTE — PROGRESS NOTES
[facility] Progress Note   Progress Note    Chief Complaint:  Patient presents with:  Abdominal Pain     Subjective:    Feeling ok. Asked me to look at mole. sw working on OP HD.         Current Medications:    Current Outpatient Medications:   • 10.0 - 20.0    Calcium, Total 6.5 (L) 8.5 - 10.1 mg/dL    Calculated Osmolality 306 (H) 275 - 295 mOsm/kg    GFR, Non- 7 (L) >=60    GFR, -American 8 (L) >=60    Albumin 2.3 (L) 3.4 - 5.0 g/dL    Phosphorus 4.1 2.5 - 4.9 mg/dL   Magn EXCHANGE CHECK   Final Result    PROCEDURE: IR TUNNELED CV CATH INSERTION          INDICATIONS: Renal failure         SEDATION:   Dr. Leonel Alva, the supervising physician, provided moderate     sedation services for a total intra-service time of 10 minutes for placement for hemodialysis.                           Dictated by (CST): Julio Cesar Epps MD on 12/03/2021 at 10:44 AM         Finalized by (CST): Julio Cesar Epps MD on 12/03/2021 at 10:44 AM               RADIOLOGY RESULT SCAN   Final Result     XR CHEST AP FINDINGS:     LIVER: 2.6 cm low-attenuation lesion in the dome of the right lobe of the     liver. GALLBLADDER: Normal size and appearance. BILIARY: No visible dilatation or calcification. SPLEEN: No enlargement or focal lesion.       STOMACH: No fibromyalgia, but hasn't seen a physician in many years, presents with weakness abd pain, for severe weakness.     # ISELA 2/2 bladder outlet obs from enlarged prostate   # uremeia  - CT with severe bladder distention, and bilateral hydro, likely present for 6.8; monitor  - hg 6.9 today  - OP GI work up     Liver Lesion   - noted on CT  - unclear etiology  - will need MRI with contrast for further deliniation  - will defer to outpatient work up.  Pt aware.      Hypocalcemia   - s/p calcium gluconate, replace as

## 2021-12-06 NOTE — OCCUPATIONAL THERAPY NOTE
OCCUPATIONAL THERAPY TREATMENT NOTE - INPATIENT        Room Number: 647/624-W                Problem List  Principal Problem:    Urinary retention  Active Problems:    Hydronephrosis, unspecified hydronephrosis type    ISELA (acute kidney injury) ([facility]    Hy patient currently need…  -   Putting on and taking off regular lower body clothing?: A Little  -   Bathing (including washing, rinsing, drying)?: A Little  -   Toileting, which includes using toilet, bedpan or urinal? : None  -   Putting on and taking off

## 2021-12-06 NOTE — PHYSICAL THERAPY NOTE
PHYSICAL THERAPY TREATMENT NOTE - INPATIENT     Room Number: 018/407-C       Presenting Problem: urinary retention; ISELA; decreased appetite, abdominal pain; (+) COVID-19    Problem List  Principal Problem:    Urinary retention  Active Problems:    Hydronep training    SUBJECTIVE  \"I have no concerns\"    OBJECTIVE  Precautions:  (Contact & Droplet: (+) COVID-19)    BALANCE                                                                                                                       Static Sitting: Go provided to patient in preparation for discharge.    Goal #4   Current Status Goal met

## 2021-12-06 NOTE — PLAN OF CARE
Patient resting well with no acute changes.  Care transitioned to ÁNGEL Alvarez  Problem: Patient Centered Care  Goal: Patient preferences are identified and integrated in the patient's plan of care  Description: Interventions:  - What would you like us to know including rhythm and repeat lab results as appropriate  - Fluid restriction as ordered  - Instruct patient on fluid and nutrition restrictions as appropriate  Outcome: Progressing  Goal: Hemodynamic stability and optimal renal function maintained  Descript injury  Description: INTERVENTIONS:  - Assess pt frequently for physical needs  - Identify cognitive and physical deficits and behaviors that affect risk of falls.   - [place] fall precautions as indicated by assessment.  - Educate pt/family on patient sa

## 2021-12-06 NOTE — DIETARY NOTE
ADULT NUTRITION REASSESSMENT    Pt is at moderate nutrition risk. Pt does not meet malnutrition criteria.       RECOMMENDATIONS TO MD:  See Nutrition Intervention    ADMITTING DIAGNOSIS:   Hypocalcemia [E83.51]  Urinary retention [R33.9]  ISELA (acute kidney benefit of this supplement as it contains no K. Would not recommended for continued use at home given high added sugar level. Would recommend Nepro, high in deidra, pro and limited K and Phos. All questions answered regarding diet.      FOOD/NUTRITION RELATED index is 26.56 kg/m².   BMI CLASSIFICATION: 25-29.9 kg/m2 - overweight  IBW: 148 lbs        110% IBW  Usual Body Wt: 178 lbs       92% UBW    WEIGHT HISTORY:  Patient Weight(s) for the past 336 hrs:   Weight   12/06/21 0630 76.9 kg (169 lb 9.6 oz)   12/05/2 maintain wt within 5%, PO and supplement greater than 75% of needs, good supplement intake, prevent skin breakdown, improved GI status and labs within acceptable limits    DIETITIAN FOLLOW UP: RD to follow and monitor nutrition status      Brice Macias MS,

## 2021-12-06 NOTE — PAYOR COMM NOTE
--------------  CONTINUED STAY REVIEW    Payor: Thor Cortez #:  [id number]  Authorization Number: [id number]    Admit date: 11/30/21  Admit time: 12:23 AM    FAXING CLINICAL UPDATE FOR 12/5/21 12/5/21   Chief Co Collection Time: 12/05/21  5:25 AM   Result Value Ref Range     Magnesium 1.7 1.6 - 2.6 mg/dL   CBC W/ DIFFERENTIAL     Collection Time: 12/05/21  5:25 AM   Result Value Ref Range     WBC 4.7 4.0 - 11.0 x10(3) uL     RBC 2.29 (L) 4.30 - 5.70 x10(6)uL     H hold off for now. - Trend labs.  Cont SQH for DVT ppx.      UTI   - Cx + for enterococcus    - Abx changed to Augmentin per ID recs      Diarrhea /  C Diff infection   -  C diff positive, although pt denies significant diarrhea, some loose stool, normal B units weekly.     Hypocalcemia:  - calcium gluconate.     Elevated BP:  - From ISELA  - Can give hydralazine, start amlodipine if remains elevated.     Bilateral hydronephrosis:  - rurutia catheter as per urology.     COVID-19 PNA:  - As per primary/ID     Judy

## 2021-12-06 NOTE — PLAN OF CARE
No fluid removed per dialysis RN. Education provided about treatment options. Reinforced education per this RN.     Problem: METABOLIC/FLUID AND ELECTROLYTES - ADULT  Goal: Electrolytes maintained within normal limits  Description: INTERVENTIONS:  - Monitor

## 2021-12-06 NOTE — PROGRESS NOTES
[address] [address] [facility]  Progress Note    Jose Maria Anderson Patient Status:  Inpatient    1960 MRN [id number]   Location [facility] Attending Kait Piedra MD   Hosp Day # 6 PCP Era Fields pulmonary symptoms  - Received a single dose of COVID vaccine in August  - s/p Regen-CoV 11/30/21     2.  Obstructive uropathy with acute renal failure  - CT with evidence of obstruction and massive hydronephrosis  - Martinez placed, urology following  - Cult

## 2021-12-06 NOTE — PROGRESS NOTES
[facility]    Progress Note      Subjective: Tolerated dialysis earlier today   No complaints      Objective:   Vital Signs:  Blood pressure 146/90, pulse 106, temperature 98.7 °F (37.1 °C), temperature source Oral, resp.  rate 18, heigh - Vitamin D 50K units weekly.   - calcitrol 0.25 mcg daily      Hypocalcemia:  - s/p calcium gluconate, should improve with dialysis       Elevated BP:  - start amlodipine if persistently elevated       Bilateral hydronephrosis:  - urrutia catheter as per u

## 2021-12-07 PROCEDURE — 85025 COMPLETE CBC W/AUTO DIFF WBC: CPT | Performed by: INTERNAL MEDICINE

## 2021-12-07 PROCEDURE — 80069 RENAL FUNCTION PANEL: CPT | Performed by: INTERNAL MEDICINE

## 2021-12-07 NOTE — PROGRESS NOTES
[address] [facility] Infectious Disease  Progress Note    Daphne Akins Patient Status:  Inpatient    1960 MRN [id number]   Location [facility]5W Attending Justin Minor MD   Hosp Day # 7 PCP Talha Roque and he was concerned it was due to the vaccine so he never got a 2nd dose  - No pulmonary symptoms  - Received a single dose of COVID vaccine in August  - s/p Regen-CoV 11/30/21     2.  Obstructive uropathy with acute renal failure  - CT with evidence of o

## 2021-12-07 NOTE — PROGRESS NOTES
[facility]    Progress Note      Subjective:     Patient seen from doorway due to covid isolation status / preservation of PPE   Doing well, no new complaints. Objective:   Vital Signs:  Blood pressure 110/76, pulse 93, temperature 98. 5    Hypocalcemia:  - s/p calcium gluconate, should improve with dialysis       Elevated BP:  -monitor BP, start amlodipine if persistently elevated      Bilateral hydronephrosis:  - urrutia catheter as per urology.     COVID-19 PNA:  - As per primary/ID

## 2021-12-07 NOTE — PLAN OF CARE
Pt's Hgb in the am was 6.9. MD aware. Currently giving 1 unit of PRBCs. Ordered time redraw for Hgb. Pt had follow up with dietician. Plan pending with social work. Pt had dialysis in the am. Martinez is intact and draining.  No other acute changes throughout limits  Description: INTERVENTIONS:  - Monitor labs and rhythm and assess patient for signs and symptoms of electrolyte imbalances  - Administer electrolyte replacement as ordered  - Monitor response to electrolyte replacements, including rhythm and repeat response  - Consider cultural and social influences on pain and pain management  - Manage/alleviate anxiety  - Utilize distraction and/or relaxation techniques  - Monitor for opioid side effects  - Notify MD/LIP if interventions unsuccessful or patient rep

## 2021-12-07 NOTE — PROGRESS NOTES
[facility] Hospitalist Progress Note   Progress Note    Chief Complaint:  Patient presents with:  Abdominal Pain     Subjective:    No acute events.          Current Medications:    Current Outpatient Medications:   •  vancomycin 125 MG Oral Cap, Take 1 capsule 12:40 AM   Result Value Ref Range    Blood Product [id number]     Unit Number [id number]     UNIT ABO A     UNIT RH POS     Product Status Presumed Transfused     Expiration Date [id number]     Blood Type Barcode 2750    CBC W/ DIFFERENTIAL    Collect exam.  No deep venous thrombosis.                    Dictated by (CST): Mickey Delong MD on 12/02/2021 at 4:24 PM         Finalized by (CST): Mickey Delong MD on 12/02/2021 at 4:26 PM               IR TUNNELED CV CATH INSERTION EXCHANGE CHECK sterile dressing applied. FINDINGS:     IMAGING: No sonographic or fluoroscopic abnormalities. VEIN ACCESSED: Right internal jugular.       COMPLICATIONS: None.                          =====    CONCLUSION: Tunneled permacath placement for he reconstruction technique for dose reduction was used. Dose information is     transmitted to the ACR [facility]) NRDR ([address]) which includes the Dose Index Registry.          FINDINGS:     LIVER: 2.6 c Jerry Brush MD on 11/30/2021 at 6:48 AM             Finalized by (CST): Jerry Brush MD on 11/30/2021 at 6:55 AM                  Assessment and Plan  Patient is a 62year old male with PMH sig for fibromyalgia, but hasn't seen a phy saturation, TIBC low as well, ferritin elevated but in the setting of covid / UTI   - received 1 unit PRBC on 12/1 for Hgb 6.8; monitor  - sp additional unit 12/6  - OP GI work up     Liver Lesion   - noted on CT  - unclear etiology  - will need MRI with c

## 2021-12-07 NOTE — PLAN OF CARE
Patient is alert & oriented x4. On room air. Vitals stable. Denies any pain. Saline locked. PO vanco & PO augmentin. Tolerating renal diet. Hgb s/p 1U PRBC was 8.4. Right permacath intact. Martinez intact & draining to gravity. Up independently.  Plan for poss normal limits  Description: INTERVENTIONS:  - Monitor labs and rhythm and assess patient for signs and symptoms of electrolyte imbalances  - Administer electrolyte replacement as ordered  - Monitor response to electrolyte replacements, including rhythm and Notify MD/LIP if interventions unsuccessful or patient reports new pain  - Anticipate increased pain with activity and pre-medicate as appropriate  Outcome: Progressing     Problem: SAFETY ADULT - FALL  Goal: Free from fall injury  Description: INTERVENTIO

## 2021-12-08 VITALS
DIASTOLIC BLOOD PRESSURE: 93 MMHG | HEART RATE: 89 BPM | HEIGHT: 67 IN | SYSTOLIC BLOOD PRESSURE: 135 MMHG | OXYGEN SATURATION: 99 % | TEMPERATURE: 99 F | WEIGHT: 169.63 LBS | BODY MASS INDEX: 26.62 KG/M2 | RESPIRATION RATE: 16 BRPM

## 2021-12-08 PROCEDURE — 80069 RENAL FUNCTION PANEL: CPT | Performed by: INTERNAL MEDICINE

## 2021-12-08 PROCEDURE — 90935 HEMODIALYSIS ONE EVALUATION: CPT

## 2021-12-08 RX ORDER — TRAZODONE HYDROCHLORIDE 50 MG/1
50 TABLET ORAL NIGHTLY PRN
Qty: 3 TABLET | Refills: 0 | Status: SHIPPED | OUTPATIENT
Start: 2021-12-08 | End: 2022-01-26

## 2021-12-08 NOTE — PLAN OF CARE
Patient A&Ox4, vital signs stable, no complaints of pain. No acute changes overnight. On PO vancomycin and Augmentin. Martinez intact and draining to gravity. Permacath to right jugular in place. Patient to have dialysis in AM, possible discharge today.  Call limits  Description: INTERVENTIONS:  - Monitor labs and rhythm and assess patient for signs and symptoms of electrolyte imbalances  - Administer electrolyte replacement as ordered  - Monitor response to electrolyte replacements, including rhythm and repeat MD/LIP if interventions unsuccessful or patient reports new pain  - Anticipate increased pain with activity and pre-medicate as appropriate  Outcome: Progressing     Problem: SAFETY ADULT - FALL  Goal: Free from fall injury  Description: INTERVENTIONS:  -

## 2021-12-08 NOTE — PLAN OF CARE
Patient A&Ox's 4. Patient has safety precautions in place bed in the lowest position, bed alarm on, and call light within reach. Continue to monitor patient with frequent nursing rounds.    Problem: Patient Centered Care  Goal: Patient preferences are ident imbalances  - Administer electrolyte replacement as ordered  - Monitor response to electrolyte replacements, including rhythm and repeat lab results as appropriate  - Fluid restriction as ordered  - Instruct patient on fluid and nutrition restrictions as a pre-medicate as appropriate  Outcome: Progressing     Problem: SAFETY ADULT - FALL  Goal: Free from fall injury  Description: INTERVENTIONS:  - Assess pt frequently for physical needs  - Identify cognitive and physical deficits and behaviors that affect ri

## 2021-12-08 NOTE — CM/SW NOTE
Problem: Bowel/Gastric:  Goal: Normal bowel function is maintained or improved  Received MOM, suppository, and fleets enema.  Pt was able to have a bowel movement this afternoon.       Wednesday 12/8/2021    Pt to DC today after HD.      Dalphine Cockayne with [facility] has confirmed start of care:     Information AVS, hard chart and copy of fax given to pt.      Facility Name: [address] [place] [address] Dialysis   Facility Address: [facility]

## 2021-12-08 NOTE — PROGRESS NOTES
[facility]    Progress Note      Subjective:     No new complaints   Denies SOB      Objective:   Vital Signs:  Blood pressure 128/90, pulse 84, temperature 99 °F (37.2 °C), temperature source Oral, resp.  rate 16, height 5' 7\" (1.702 m), w weekly.   - calcitrol 0.25 mcg daily      Hypocalcemia:  - s/p calcium gluconate, should improve with dialysis       Elevated BP:  - BP improved      Bilateral hydronephrosis:  - urrutia catheter as per urology.     COVID-19 PNA:  - As per primary/ID      Judy

## 2021-12-08 NOTE — PROGRESS NOTES
Discharge RN Summary: Patient has discharge order in. Patient to discharge home with wife- on phone for dc instructions. IV to be removed by RN Iris prior to dc- pt getting HD when RN was going over instructions.   Understands to follow up with PCP in 1 wee

## 2021-12-08 NOTE — PROGRESS NOTES
[address] [facility] Infectious Disease  Progress Note    Faraz  Patient Status:  Inpatient    1960 MRN [id number]   Location [facility]5W Attending Suraj Olsen MD   Hosp Day # 8 PCP CORNELIO Mitchell August  - s/p Regen-CoV 11/30/21     2.  Obstructive uropathy with acute renal failure  - CT with evidence of obstruction and massive hydronephrosis  - Martinez placed, urology following  - Cultures with enterococcus  - Augmentin p.o. day #8, repeat UA improv

## 2021-12-09 ENCOUNTER — PATIENT OUTREACH (OUTPATIENT)
Dept: CASE MANAGEMENT | Age: 61
End: 2021-12-09

## 2021-12-09 DIAGNOSIS — R33.9 URINARY RETENTION: ICD-10-CM

## 2021-12-09 DIAGNOSIS — N13.30 HYDRONEPHROSIS, UNSPECIFIED HYDRONEPHROSIS TYPE: ICD-10-CM

## 2021-12-09 DIAGNOSIS — E83.51 HYPOCALCEMIA: ICD-10-CM

## 2021-12-09 DIAGNOSIS — N17.9 AKI (ACUTE KIDNEY INJURY) (HCC): ICD-10-CM

## 2021-12-09 DIAGNOSIS — U07.1 COVID-19: ICD-10-CM

## 2021-12-09 PROCEDURE — 1111F DSCHRG MED/CURRENT MED MERGE: CPT

## 2021-12-09 NOTE — DISCHARGE SUMMARY
General Medicine Discharge Summary     Patient ID:  Darwin Prescott  62year old  12/17/1960    Admit date: 11/29/2021    Discharge date and time: 12/08/21    Attending Physician: No att. providers found     Primary Care Physician: Lizandro Pena MD will recover, may need chronic urrutia vs CIC  - plan to continue urrutia indefinitely, fu with urology as outpatient for further assessment.   RN to discuss urrutia care      Covid PNA  - cough and congestion the past 2 days prior to admission, tested + on 11/29 CN intact, sensory intact  Psych: Affect- normal  SKIN: warm, dry  EXT: no edema    Operative Procedures:      Imaging: No results found. Home Medication Changes:     I reconciled current and discharge medications on day of discharge.  These medicati 12/10/2021 2:30 PM  Regular Treatment Days & Time: MWF 3:15PM          Follow-up with labs: none    Total Time Coordinating Care: Greater than 30 minutes    Patient had opportunity to ask questions and state understand and agree with therapeutic plan as ou

## 2021-12-09 NOTE — PLAN OF CARE
Patient left wanting to walk and stable condition. Patient Iv was removed and AVS given to patient and explained and reviewed. Patient medication was sent to [facility] traZODone and vancomycin. Patient was taught ho to use and clean urrutia.      .   Problem: review patient's medication profile  - Implement strategies to promote bladder emptying  12/8/2021 1843 by Phi Nolan RN  Outcome: Completed  12/8/2021 1502 by Phi Nolan RN  Outcome: Progressing     Problem: METABOLIC/FLUID AND ELECTROLYTES - ADULT  G ADULT  Goal: Verbalizes/displays adequate comfort level or patient's stated pain goal  Description: INTERVENTIONS:  - Encourage pt to monitor pain and request assistance  - Assess pain using appropriate pain scale  - Administer analgesics based on type and

## 2021-12-09 NOTE — PROGRESS NOTES
Initial Post Discharge Follow Up   Discharge Date: 12/8/21  Contact Date: 12/9/2021    Consent Verification:  Assessment Completed With: Patient  HIPAA Verified?   Yes    Discharge Dx:     Hypocalcemia [E83.51]  Urinary retention [R33.9]  ISELA (acute kidn Patients that have been asymptomatic should isolate for 14 days after the first day of the positive test. Patient reports he is following a low sodium diet, and is drinking one can of Nepro daily.  Patient denies any redness, increasing swelling or drainage Dispense Refill   • traZODone 50 MG Oral Tab Take 1 tablet (50 mg total) by mouth nightly as needed for Sleep. 3 tablet 0   • vancomycin 125 MG Oral Cap Take 1 capsule (125 mg total) by mouth every 6 (six) hours.  28 capsule 0     • (NCM)  Were there any me concerns, Etc): No     Follow up appointments:      Your appointments     Date & Time Appointment Department [facility])    Dec 14, 2021  3:40 PM CST Exam - New Patient with Navarro Armando MD [facility] ([address] [place].     Interventions by NCM: NCM reviewed medications, discharge instructions, S&S of infection/blood clots. Patient instructed to report any new or worsening symptoms, when to call the doctor and when to call 911.  Patient verbalized understanding of the

## 2021-12-14 ENCOUNTER — LAB ENCOUNTER (OUTPATIENT)
Dept: LAB | Age: 61
End: 2021-12-14
Attending: INTERNAL MEDICINE
Payer: MEDICAID

## 2021-12-14 ENCOUNTER — OFFICE VISIT (OUTPATIENT)
Dept: INTERNAL MEDICINE CLINIC | Facility: CLINIC | Age: 61
End: 2021-12-14
Payer: MEDICAID

## 2021-12-14 VITALS
SYSTOLIC BLOOD PRESSURE: 110 MMHG | WEIGHT: 165.38 LBS | OXYGEN SATURATION: 97 % | HEIGHT: 66.9 IN | TEMPERATURE: 99 F | BODY MASS INDEX: 25.96 KG/M2 | HEART RATE: 85 BPM | DIASTOLIC BLOOD PRESSURE: 72 MMHG

## 2021-12-14 DIAGNOSIS — D22.9 ATYPICAL MOLE: ICD-10-CM

## 2021-12-14 DIAGNOSIS — R16.0 LIVER MASS: ICD-10-CM

## 2021-12-14 DIAGNOSIS — N39.0 URINARY TRACT INFECTION WITHOUT HEMATURIA, SITE UNSPECIFIED: ICD-10-CM

## 2021-12-14 DIAGNOSIS — M79.7 FIBROMYALGIA: ICD-10-CM

## 2021-12-14 DIAGNOSIS — N17.9 ACUTE RENAL FAILURE, UNSPECIFIED ACUTE RENAL FAILURE TYPE (HCC): ICD-10-CM

## 2021-12-14 DIAGNOSIS — U07.1 PNEUMONIA DUE TO COVID-19 VIRUS: ICD-10-CM

## 2021-12-14 DIAGNOSIS — H61.23 HEARING LOSS SECONDARY TO CERUMEN IMPACTION, BILATERAL: ICD-10-CM

## 2021-12-14 DIAGNOSIS — N17.9 AKI (ACUTE KIDNEY INJURY) (HCC): ICD-10-CM

## 2021-12-14 DIAGNOSIS — D64.9 ANEMIA, UNSPECIFIED TYPE: ICD-10-CM

## 2021-12-14 DIAGNOSIS — M54.9 BACK PAIN, UNSPECIFIED BACK LOCATION, UNSPECIFIED BACK PAIN LATERALITY, UNSPECIFIED CHRONICITY: ICD-10-CM

## 2021-12-14 DIAGNOSIS — R93.2 ABNORMAL CT OF LIVER: ICD-10-CM

## 2021-12-14 DIAGNOSIS — R33.9 URINARY RETENTION: Primary | ICD-10-CM

## 2021-12-14 DIAGNOSIS — J12.82 PNEUMONIA DUE TO COVID-19 VIRUS: ICD-10-CM

## 2021-12-14 DIAGNOSIS — N13.30 HYDRONEPHROSIS, UNSPECIFIED HYDRONEPHROSIS TYPE: ICD-10-CM

## 2021-12-14 PROCEDURE — 84443 ASSAY THYROID STIM HORMONE: CPT

## 2021-12-14 PROCEDURE — 80061 LIPID PANEL: CPT

## 2021-12-14 PROCEDURE — 36415 COLL VENOUS BLD VENIPUNCTURE: CPT

## 2021-12-14 PROCEDURE — 3078F DIAST BP <80 MM HG: CPT | Performed by: INTERNAL MEDICINE

## 2021-12-14 PROCEDURE — 3008F BODY MASS INDEX DOCD: CPT | Performed by: INTERNAL MEDICINE

## 2021-12-14 PROCEDURE — 99204 OFFICE O/P NEW MOD 45 MIN: CPT | Performed by: INTERNAL MEDICINE

## 2021-12-14 PROCEDURE — 3074F SYST BP LT 130 MM HG: CPT | Performed by: INTERNAL MEDICINE

## 2021-12-14 PROCEDURE — 84439 ASSAY OF FREE THYROXINE: CPT

## 2021-12-14 PROCEDURE — 80053 COMPREHEN METABOLIC PANEL: CPT

## 2021-12-14 PROCEDURE — 83036 HEMOGLOBIN GLYCOSYLATED A1C: CPT

## 2021-12-14 PROCEDURE — 85025 COMPLETE CBC W/AUTO DIFF WBC: CPT

## 2021-12-14 RX ORDER — PHENOL 1.4 %
1 AEROSOL, SPRAY (ML) MUCOUS MEMBRANE NIGHTLY
COMMUNITY
End: 2022-01-26

## 2021-12-14 NOTE — PROGRESS NOTES
HPI:    Patient ID: Darwin Prescott is a 62year old male. Presents to the office for the first time    S/P hospitalization 12/9 after presenting to emergency room with complaints of generalized weakness and malaise.   Patient was found to be in acut Social History    Tobacco Use      Smoking status: Never Smoker      Smokeless tobacco: Never Used    Vaping Use      Vaping Use: Never used    Alcohol use: Not Currently    Drug use: Not Currently        Review of Systems   Constitutional: Positive fo cerumen impaction noted     Nose: No congestion. Mouth/Throat:      Pharynx: No oropharyngeal exudate. Eyes:      General: No scleral icterus. Extraocular Movements: Extraocular movements intact.       Pupils: Pupils are equal, round, and reactiv unspecified  Atypical mole  Back pain, unspecified back location, unspecified back pain laterality, unspecified chronicity  Acute renal failure, unspecified acute renal failure type (hcc)  Abnormal ct of liver  Anemia, unspecified type  Hearing loss second this.  Will check CBC today. Patient also will need eventual GI evaluation including screening colonoscopy--we will hold off for now unless there is significant decrease in hemoglobin. Patient declines seasonal flu vaccine today.     He is having nery

## 2022-01-07 ENCOUNTER — TELEPHONE (OUTPATIENT)
Dept: INTERNAL MEDICINE CLINIC | Facility: CLINIC | Age: 62
End: 2022-01-07

## 2022-01-07 NOTE — TELEPHONE ENCOUNTER
Patient is calling he would like to get an order to get a new catheter bag for exercising    Coveen Active Leg bag  SKU [id number], HCPS Code     Join Anda    The company requires an order from Dr Joan Monroe, will Dr Joan Monroe write him an o

## 2022-01-13 ENCOUNTER — OFFICE VISIT (OUTPATIENT)
Dept: INTERNAL MEDICINE CLINIC | Facility: CLINIC | Age: 62
End: 2022-01-13
Payer: MEDICAID

## 2022-01-13 VITALS
BODY MASS INDEX: 24.92 KG/M2 | OXYGEN SATURATION: 99 % | WEIGHT: 158.81 LBS | SYSTOLIC BLOOD PRESSURE: 122 MMHG | HEIGHT: 66.9 IN | RESPIRATION RATE: 18 BRPM | DIASTOLIC BLOOD PRESSURE: 78 MMHG | HEART RATE: 82 BPM

## 2022-01-13 DIAGNOSIS — H61.23 BILATERAL IMPACTED CERUMEN: Primary | ICD-10-CM

## 2022-01-13 DIAGNOSIS — K76.89 LIVER NODULE: ICD-10-CM

## 2022-01-13 DIAGNOSIS — N18.6 END STAGE RENAL DISEASE (HCC): ICD-10-CM

## 2022-01-13 PROCEDURE — 69210 REMOVE IMPACTED EAR WAX UNI: CPT | Performed by: INTERNAL MEDICINE

## 2022-01-13 PROCEDURE — 3008F BODY MASS INDEX DOCD: CPT | Performed by: INTERNAL MEDICINE

## 2022-01-13 PROCEDURE — 3074F SYST BP LT 130 MM HG: CPT | Performed by: INTERNAL MEDICINE

## 2022-01-13 PROCEDURE — 99213 OFFICE O/P EST LOW 20 MIN: CPT | Performed by: INTERNAL MEDICINE

## 2022-01-13 PROCEDURE — 3078F DIAST BP <80 MM HG: CPT | Performed by: INTERNAL MEDICINE

## 2022-01-13 NOTE — PROGRESS NOTES
HPI:    Patient ID: Jarett Ellison is a 62year old male. Patient presents with complaints of decreasing hearing bilateral ears. At last visit patient was noted to have significant cerumen.   He was instructed to get earwax softening drops and try Right lower leg: No edema. Left lower leg: No edema. Neurological:      Mental Status: He is alert.    Psychiatric:         Mood and Affect: Mood normal.                ASSESSMENT/PLAN:   Bilateral impacted cerumen  (primary encounter diagnosis)  E

## 2022-01-15 ENCOUNTER — PATIENT MESSAGE (OUTPATIENT)
Dept: INTERNAL MEDICINE CLINIC | Facility: CLINIC | Age: 62
End: 2022-01-15

## 2022-01-17 NOTE — TELEPHONE ENCOUNTER
From: Ishan Monique  To: Dontrell Sauceda MD  Sent: 1/15/2022 2:58 PM CST  Subject: Flu shot, pneumonia shot, and time frame for when I receive my next code of accidation?     Hi Dr. Sebastián Darden,     Dialysis center wants to shoot me up with a flu shot and

## 2022-01-19 NOTE — TELEPHONE ENCOUNTER
Letter written and patient can obtain it in my chart. Let him know what flu and pneumonia vaccines we have to offer him.

## 2022-01-26 RX ORDER — CALCIUM ACETATE 667 MG/1
CAPSULE ORAL
COMMUNITY
Start: 2021-12-22

## 2022-02-03 PROBLEM — R33.9 RETENTION, URINE: Status: ACTIVE | Noted: 2021-11-29

## 2022-02-07 ENCOUNTER — PATIENT MESSAGE (OUTPATIENT)
Dept: INTERNAL MEDICINE CLINIC | Facility: CLINIC | Age: 62
End: 2022-02-07

## 2022-02-10 ENCOUNTER — HOSPITAL ENCOUNTER (OUTPATIENT)
Dept: MRI IMAGING | Facility: HOSPITAL | Age: 62
Discharge: HOME | End: 2022-02-10
Attending: INTERNAL MEDICINE
Payer: MEDICAID

## 2022-02-10 DIAGNOSIS — R93.2 ABNORMAL CT OF LIVER: ICD-10-CM

## 2022-02-10 PROCEDURE — 74181 MRI ABDOMEN W/O CONTRAST: CPT | Performed by: INTERNAL MEDICINE

## 2022-02-21 ENCOUNTER — PATIENT MESSAGE (OUTPATIENT)
Dept: INTERNAL MEDICINE CLINIC | Facility: CLINIC | Age: 62
End: 2022-02-21

## 2022-03-03 ENCOUNTER — OFFICE VISIT (OUTPATIENT)
Dept: INTERNAL MEDICINE CLINIC | Facility: CLINIC | Age: 62
End: 2022-03-03
Payer: MEDICAID

## 2022-03-03 VITALS
HEART RATE: 76 BPM | WEIGHT: 161 LBS | DIASTOLIC BLOOD PRESSURE: 76 MMHG | SYSTOLIC BLOOD PRESSURE: 118 MMHG | BODY MASS INDEX: 25.88 KG/M2 | OXYGEN SATURATION: 100 % | TEMPERATURE: 98 F | HEIGHT: 66 IN

## 2022-03-03 DIAGNOSIS — N13.30 HYDRONEPHROSIS, UNSPECIFIED HYDRONEPHROSIS TYPE: ICD-10-CM

## 2022-03-03 DIAGNOSIS — N18.6 END STAGE RENAL DISEASE (HCC): Primary | ICD-10-CM

## 2022-03-03 PROCEDURE — 3078F DIAST BP <80 MM HG: CPT | Performed by: INTERNAL MEDICINE

## 2022-03-03 PROCEDURE — 99213 OFFICE O/P EST LOW 20 MIN: CPT | Performed by: INTERNAL MEDICINE

## 2022-03-03 PROCEDURE — 3008F BODY MASS INDEX DOCD: CPT | Performed by: INTERNAL MEDICINE

## 2022-03-03 PROCEDURE — 3074F SYST BP LT 130 MM HG: CPT | Performed by: INTERNAL MEDICINE

## 2022-03-10 ENCOUNTER — PATIENT MESSAGE (OUTPATIENT)
Dept: INTERNAL MEDICINE CLINIC | Facility: CLINIC | Age: 62
End: 2022-03-10

## 2022-03-16 ENCOUNTER — PATIENT MESSAGE (OUTPATIENT)
Dept: INTERNAL MEDICINE CLINIC | Facility: CLINIC | Age: 62
End: 2022-03-16

## 2022-04-07 ENCOUNTER — OFFICE VISIT (OUTPATIENT)
Dept: NEPHROLOGY | Facility: CLINIC | Age: 62
End: 2022-04-07
Payer: MEDICAID

## 2022-04-07 VITALS
WEIGHT: 161 LBS | SYSTOLIC BLOOD PRESSURE: 106 MMHG | DIASTOLIC BLOOD PRESSURE: 67 MMHG | HEIGHT: 66 IN | BODY MASS INDEX: 25.88 KG/M2 | HEART RATE: 73 BPM

## 2022-04-07 DIAGNOSIS — Z99.2 ESRD (END STAGE RENAL DISEASE) ON DIALYSIS (HCC): Primary | ICD-10-CM

## 2022-04-07 DIAGNOSIS — N18.6 ESRD (END STAGE RENAL DISEASE) ON DIALYSIS (HCC): Primary | ICD-10-CM

## 2022-04-07 PROCEDURE — 3008F BODY MASS INDEX DOCD: CPT | Performed by: INTERNAL MEDICINE

## 2022-04-07 PROCEDURE — 3074F SYST BP LT 130 MM HG: CPT | Performed by: INTERNAL MEDICINE

## 2022-04-07 PROCEDURE — 3078F DIAST BP <80 MM HG: CPT | Performed by: INTERNAL MEDICINE

## 2022-04-07 PROCEDURE — 99245 OFF/OP CONSLTJ NEW/EST HI 55: CPT | Performed by: INTERNAL MEDICINE

## 2022-04-07 RX ORDER — FOLIC ACID/VIT B COMPLEX AND C 0.8 MG
TABLET ORAL
COMMUNITY

## 2022-08-03 DIAGNOSIS — Z12.9 CANCER SCREENING: Primary | ICD-10-CM

## 2022-08-15 ENCOUNTER — PATIENT MESSAGE (OUTPATIENT)
Dept: INTERNAL MEDICINE CLINIC | Facility: CLINIC | Age: 62
End: 2022-08-15

## 2022-08-15 DIAGNOSIS — G89.29 CHRONIC PAIN OF RIGHT UPPER EXTREMITY: Primary | ICD-10-CM

## 2022-08-15 DIAGNOSIS — M79.601 CHRONIC PAIN OF RIGHT UPPER EXTREMITY: Primary | ICD-10-CM

## 2022-08-15 NOTE — TELEPHONE ENCOUNTER
To CAYETANO/Qian Brooks Peaks:  Please assist with authorization for PT referral.  [facility]  [place]  [address]   [address]  Phone: [phone]  Fax: [phone]  Thanks!!

## 2022-08-16 NOTE — TELEPHONE ENCOUNTER
From: Martha Bucio  Sent: 8/15/2022 4:01 PM CDT  To: [facility] Clinical Staff  Subject: PT     Thank you !!!

## 2022-08-25 ENCOUNTER — TELEPHONE (OUTPATIENT)
Dept: CASE MANAGEMENT | Age: 62
End: 2022-08-25

## 2022-08-25 DIAGNOSIS — Z12.11 COLON CANCER SCREENING: Primary | ICD-10-CM

## 2022-09-15 DIAGNOSIS — Z12.9 CANCER SCREENING: Primary | ICD-10-CM

## 2022-09-21 ENCOUNTER — LAB ENCOUNTER (OUTPATIENT)
Dept: LAB | Facility: HOSPITAL | Age: 62
End: 2022-09-21
Attending: INTERNAL MEDICINE

## 2022-09-21 DIAGNOSIS — Z12.9 CANCER SCREENING: ICD-10-CM

## 2022-09-21 LAB — COMPLEXED PSA SERPL-MCNC: 20 NG/ML (ref ?–4)

## 2022-09-21 PROCEDURE — 36415 COLL VENOUS BLD VENIPUNCTURE: CPT

## 2022-09-23 ENCOUNTER — LAB ENCOUNTER (OUTPATIENT)
Dept: LAB | Facility: HOSPITAL | Age: 62
End: 2022-09-23
Attending: INTERNAL MEDICINE

## 2022-09-23 DIAGNOSIS — Z12.11 COLON CANCER SCREENING: ICD-10-CM

## 2022-09-23 LAB — HEMOCCULT STL QL: NEGATIVE

## 2022-09-23 PROCEDURE — 82274 ASSAY TEST FOR BLOOD FECAL: CPT

## 2022-10-13 ENCOUNTER — MED REC SCAN ONLY (OUTPATIENT)
Dept: INTERNAL MEDICINE CLINIC | Facility: CLINIC | Age: 62
End: 2022-10-13

## 2022-11-14 ENCOUNTER — MED REC SCAN ONLY (OUTPATIENT)
Dept: INTERNAL MEDICINE CLINIC | Facility: CLINIC | Age: 62
End: 2022-11-14

## 2022-11-23 ENCOUNTER — PATIENT MESSAGE (OUTPATIENT)
Dept: INTERNAL MEDICINE CLINIC | Facility: CLINIC | Age: 62
End: 2022-11-23

## 2022-11-23 NOTE — TELEPHONE ENCOUNTER
From: Albino Ramsey  To: Kamini Marr MD  Sent: 11/23/2022 11:29 AM CST  Subject: [employer] parking placard ? Hi Dr. Lyle Powell,     Would it be appropriate to request a [employer] parking placard for me? I think during the cold winter months there are times it could be helpful/ useful.      Thank you,     Rebecca Kendall

## 2023-02-02 ENCOUNTER — NURSE ONLY (OUTPATIENT)
Facility: CLINIC | Age: 63
End: 2023-02-02

## 2023-02-02 DIAGNOSIS — Z12.11 COLON CANCER SCREENING: Primary | ICD-10-CM

## 2023-02-02 RX ORDER — POLYETHYLENE GLYCOL 3350, SODIUM SULFATE ANHYDROUS, SODIUM BICARBONATE, SODIUM CHLORIDE, POTASSIUM CHLORIDE 236; 22.74; 6.74; 5.86; 2.97 G/4L; G/4L; G/4L; G/4L; G/4L
4 POWDER, FOR SOLUTION ORAL ONCE
Qty: 1 EACH | Refills: 0 | Status: SHIPPED | OUTPATIENT
Start: 2023-02-02 | End: 2023-02-02

## 2023-02-03 NOTE — PROGRESS NOTES
Scheduled for:  Colonoscopy 00503  Provider Name:  Dr Abel Ann  Date:  08/01/2023  Location:  [facility]  Sedation:  MAC  Time:  8:30am (Patient is aware arrival time is at 7:30am)  Prep:  Trilyte   Meds/Allergies Reconciled?:  Physician Reviewed  Diagnosis with codes:  Colon Screening Z12.11  Was patient informed to call insurance with codes (Y/N):  Yes  Referral sent?:  Referral was sent at the time of electronic surgical scheduling. [address] [facility] or [address] [place] [address] notified?:  I sent an electronic request to Endo Scheduling and received a confirmation today. Medication Orders:  Pt is aware to NOT take iron pills, herbal meds and diet supplements for 7 days before exam. Also to NOT take any form of alcohol, recreational drugs and any forms of ED meds 24 hours before exam.     Misc Orders:       Further instructions given by staff:  I provide prep instructions to patient via [employer] and reviewed date, time and location, he verbalized that he understood and is aware to call if he has any questions.

## 2023-02-15 ENCOUNTER — OFFICE VISIT (OUTPATIENT)
Dept: DERMATOLOGY CLINIC | Facility: CLINIC | Age: 63
End: 2023-02-15

## 2023-02-15 ENCOUNTER — TELEPHONE (OUTPATIENT)
Dept: INTERNAL MEDICINE CLINIC | Facility: CLINIC | Age: 63
End: 2023-02-15

## 2023-02-15 DIAGNOSIS — D23.30 BENIGN NEOPLASM OF SKIN OF FACE: ICD-10-CM

## 2023-02-15 DIAGNOSIS — D23.5 BENIGN NEOPLASM OF SKIN OF TRUNK: ICD-10-CM

## 2023-02-15 DIAGNOSIS — B35.1 ONYCHOMYCOSIS: ICD-10-CM

## 2023-02-15 DIAGNOSIS — L30.9 DERMATITIS: ICD-10-CM

## 2023-02-15 DIAGNOSIS — D23.60 BENIGN NEOPLASM OF SKIN OF UPPER LIMB, INCLUDING SHOULDER, UNSPECIFIED LATERALITY: ICD-10-CM

## 2023-02-15 DIAGNOSIS — D22.9 MULTIPLE NEVI: Primary | ICD-10-CM

## 2023-02-15 DIAGNOSIS — D23.70 BENIGN NEOPLASM OF SKIN OF LOWER LIMB, INCLUDING HIP, UNSPECIFIED LATERALITY: ICD-10-CM

## 2023-02-15 DIAGNOSIS — D23.4 BENIGN NEOPLASM OF SCALP AND SKIN OF NECK: ICD-10-CM

## 2023-02-15 PROCEDURE — 99203 OFFICE O/P NEW LOW 30 MIN: CPT | Performed by: DERMATOLOGY

## 2023-02-15 RX ORDER — TAVABOROLE TOPICAL SOLUTION, 5% 43.5 MG/ML
SOLUTION TOPICAL
Qty: 10 ML | Refills: 2 | Status: SHIPPED | OUTPATIENT
Start: 2023-02-15

## 2023-02-15 RX ORDER — ASPIRIN 81 MG/1
81 TABLET ORAL
COMMUNITY
Start: 2022-08-11

## 2023-02-15 RX ORDER — TRIAMCINOLONE ACETONIDE 1 MG/G
1 CREAM TOPICAL 2 TIMES DAILY
Qty: 30 G | Refills: 2 | Status: SHIPPED | OUTPATIENT
Start: 2023-02-15

## 2023-02-15 NOTE — TELEPHONE ENCOUNTER
I have not seen patient since March 3, 2022--patient should schedule physical and we can fill out form at that time.

## 2023-02-15 NOTE — TELEPHONE ENCOUNTER
[address] Just now (12:39 PM)     [address] [facility] [address]  Jhonathan Matamoros,   We received your [place] although have not seen you since last March (3/2/22) and need to to schedule your Annual Physical. We can fill this out at that appointment. Please call [phone] to set this up.  Thank you

## 2023-02-15 NOTE — TELEPHONE ENCOUNTER
Patient walked in office and filled out form for Persons with Disabilities Certification for Parking Placard. Please mail to state [place] when complete.     [facility]

## 2023-02-16 NOTE — TELEPHONE ENCOUNTER
Spoke to patient and relayed MD message. Sched on 3/16 at 1200. Pt request a My Chart message reminding him of OV schedule. Pt verbalized understanding and agrees with plan.

## 2023-02-17 ENCOUNTER — TELEPHONE (OUTPATIENT)
Dept: INTERNAL MEDICINE CLINIC | Facility: CLINIC | Age: 63
End: 2023-02-17

## 2023-02-18 NOTE — TELEPHONE ENCOUNTER
Reason for call:  Dialysis patient, wants to make sure he is safe to take medication. Allergies:  No Known Allergies        Discussed with patient :  He was exercising and took tylenol for next day aches/pains      Recommendations:  He was questioning the use of Midol which she is aware is used for menstrual period symptoms. Does contain acetaminophen/caffeine/pyrilamine and does not have any dose adjustments regarding his dialysis needs. I would still limit it to no more than 1-2 times in a day. Try to stick with Tylenol for mild relief  He will try to monitor his progress through the weekend, anticipate he should improve over time. Will call with any other questions or concerns.

## 2023-03-16 ENCOUNTER — LAB ENCOUNTER (OUTPATIENT)
Dept: LAB | Age: 63
End: 2023-03-16
Attending: INTERNAL MEDICINE
Payer: MEDICARE

## 2023-03-16 ENCOUNTER — OFFICE VISIT (OUTPATIENT)
Dept: INTERNAL MEDICINE CLINIC | Facility: CLINIC | Age: 63
End: 2023-03-16

## 2023-03-16 VITALS
TEMPERATURE: 99 F | DIASTOLIC BLOOD PRESSURE: 76 MMHG | HEART RATE: 72 BPM | HEIGHT: 66 IN | OXYGEN SATURATION: 98 % | WEIGHT: 163 LBS | SYSTOLIC BLOOD PRESSURE: 112 MMHG | BODY MASS INDEX: 26.2 KG/M2

## 2023-03-16 DIAGNOSIS — R97.20 PSA ELEVATION: ICD-10-CM

## 2023-03-16 DIAGNOSIS — N13.30 BILATERAL HYDRONEPHROSIS: ICD-10-CM

## 2023-03-16 DIAGNOSIS — Z00.00 PHYSICAL EXAM: ICD-10-CM

## 2023-03-16 DIAGNOSIS — Z00.00 PHYSICAL EXAM: Primary | ICD-10-CM

## 2023-03-16 DIAGNOSIS — D64.9 ANEMIA, UNSPECIFIED TYPE: ICD-10-CM

## 2023-03-16 DIAGNOSIS — N18.6 END STAGE RENAL DISEASE (HCC): ICD-10-CM

## 2023-03-16 DIAGNOSIS — M54.9 BACK PAIN, UNSPECIFIED BACK LOCATION, UNSPECIFIED BACK PAIN LATERALITY, UNSPECIFIED CHRONICITY: ICD-10-CM

## 2023-03-16 DIAGNOSIS — K40.20 BILATERAL INGUINAL HERNIA WITHOUT OBSTRUCTION OR GANGRENE, RECURRENCE NOT SPECIFIED: ICD-10-CM

## 2023-03-16 DIAGNOSIS — Z87.39 HISTORY OF FIBROMYALGIA: ICD-10-CM

## 2023-03-16 PROBLEM — Z99.2 DEPENDENCE ON RENAL DIALYSIS: Status: ACTIVE | Noted: 2023-03-16

## 2023-03-16 PROBLEM — Z99.2 DEPENDENCE ON RENAL DIALYSIS (HCC): Status: ACTIVE | Noted: 2023-03-16

## 2023-03-16 LAB
ALBUMIN SERPL-MCNC: 4.1 G/DL (ref 3.4–5)
ALBUMIN/GLOB SERPL: 1.1 {RATIO} (ref 1–2)
ALP LIVER SERPL-CCNC: 47 U/L
ALT SERPL-CCNC: 16 U/L
ANION GAP SERPL CALC-SCNC: 11 MMOL/L (ref 0–18)
AST SERPL-CCNC: 20 U/L (ref 15–37)
BASOPHILS # BLD AUTO: 0.08 X10(3) UL (ref 0–0.2)
BASOPHILS NFR BLD AUTO: 1.4 %
BILIRUB SERPL-MCNC: 0.5 MG/DL (ref 0.1–2)
BUN BLD-MCNC: 54 MG/DL (ref 7–18)
BUN/CREAT SERPL: 7.1 (ref 10–20)
CALCIUM BLD-MCNC: 9.2 MG/DL (ref 8.5–10.1)
CHLORIDE SERPL-SCNC: 103 MMOL/L (ref 98–112)
CHOLEST SERPL-MCNC: 135 MG/DL (ref ?–200)
CO2 SERPL-SCNC: 27 MMOL/L (ref 21–32)
CREAT BLD-MCNC: 7.59 MG/DL
DEPRECATED RDW RBC AUTO: 45.1 FL (ref 35.1–46.3)
EOSINOPHIL # BLD AUTO: 0.17 X10(3) UL (ref 0–0.7)
EOSINOPHIL NFR BLD AUTO: 3.1 %
ERYTHROCYTE [DISTWIDTH] IN BLOOD BY AUTOMATED COUNT: 13.1 % (ref 11–15)
EST. AVERAGE GLUCOSE BLD GHB EST-MCNC: 117 MG/DL (ref 68–126)
FASTING PATIENT LIPID ANSWER: NO
FASTING STATUS PATIENT QL REPORTED: NO
GFR SERPLBLD BASED ON 1.73 SQ M-ARVRAT: 8 ML/MIN/1.73M2 (ref 60–?)
GLOBULIN PLAS-MCNC: 3.7 G/DL (ref 2.8–4.4)
GLUCOSE BLD-MCNC: 109 MG/DL (ref 70–99)
HBA1C MFR BLD: 5.7 % (ref ?–5.7)
HCT VFR BLD AUTO: 37.2 %
HDLC SERPL-MCNC: 53 MG/DL (ref 40–59)
HGB BLD-MCNC: 12.7 G/DL
IMM GRANULOCYTES # BLD AUTO: 0.02 X10(3) UL (ref 0–1)
IMM GRANULOCYTES NFR BLD: 0.4 %
LDLC SERPL CALC-MCNC: 61 MG/DL (ref ?–100)
LYMPHOCYTES # BLD AUTO: 0.96 X10(3) UL (ref 1–4)
LYMPHOCYTES NFR BLD AUTO: 17.3 %
MCH RBC QN AUTO: 32.6 PG (ref 26–34)
MCHC RBC AUTO-ENTMCNC: 34.1 G/DL (ref 31–37)
MCV RBC AUTO: 95.4 FL
MONOCYTES # BLD AUTO: 0.33 X10(3) UL (ref 0.1–1)
MONOCYTES NFR BLD AUTO: 6 %
NEUTROPHILS # BLD AUTO: 3.98 X10 (3) UL (ref 1.5–7.7)
NEUTROPHILS # BLD AUTO: 3.98 X10(3) UL (ref 1.5–7.7)
NEUTROPHILS NFR BLD AUTO: 71.8 %
NONHDLC SERPL-MCNC: 82 MG/DL (ref ?–130)
OSMOLALITY SERPL CALC.SUM OF ELEC: 307 MOSM/KG (ref 275–295)
PLATELET # BLD AUTO: 225 10(3)UL (ref 150–450)
POTASSIUM SERPL-SCNC: 3.8 MMOL/L (ref 3.5–5.1)
PROT SERPL-MCNC: 7.8 G/DL (ref 6.4–8.2)
PSA SERPL-MCNC: 29.8 NG/ML (ref ?–4)
RBC # BLD AUTO: 3.9 X10(6)UL
SODIUM SERPL-SCNC: 141 MMOL/L (ref 136–145)
T4 FREE SERPL-MCNC: 0.9 NG/DL (ref 0.8–1.7)
TRIGL SERPL-MCNC: 121 MG/DL (ref 30–149)
TSI SER-ACNC: 6.07 MIU/ML (ref 0.36–3.74)
VLDLC SERPL CALC-MCNC: 18 MG/DL (ref 0–30)
WBC # BLD AUTO: 5.5 X10(3) UL (ref 4–11)

## 2023-03-16 PROCEDURE — 84443 ASSAY THYROID STIM HORMONE: CPT

## 2023-03-16 PROCEDURE — 80053 COMPREHEN METABOLIC PANEL: CPT

## 2023-03-16 PROCEDURE — 80061 LIPID PANEL: CPT

## 2023-03-16 PROCEDURE — 84439 ASSAY OF FREE THYROXINE: CPT

## 2023-03-16 PROCEDURE — 84153 ASSAY OF PSA TOTAL: CPT

## 2023-03-16 PROCEDURE — 83036 HEMOGLOBIN GLYCOSYLATED A1C: CPT

## 2023-03-16 PROCEDURE — 85025 COMPLETE CBC W/AUTO DIFF WBC: CPT

## 2023-03-16 PROCEDURE — 36415 COLL VENOUS BLD VENIPUNCTURE: CPT

## 2023-03-28 ENCOUNTER — TELEPHONE (OUTPATIENT)
Dept: INTERNAL MEDICINE CLINIC | Facility: CLINIC | Age: 63
End: 2023-03-28

## 2023-03-28 NOTE — TELEPHONE ENCOUNTER
DPT evaluation and plan of care signed, faxed,confirmation received, sent to scan.     Fax # [phone]  Ph # [phone]

## 2023-07-25 ENCOUNTER — TELEPHONE (OUTPATIENT)
Facility: CLINIC | Age: 63
End: 2023-07-25

## 2023-07-25 NOTE — TELEPHONE ENCOUNTER
I spoke to the pt    He was looking for his bowel prep instructions    I let him know we would put the instructions on MyChart for him

## 2023-07-26 ENCOUNTER — TELEPHONE (OUTPATIENT)
Facility: CLINIC | Age: 63
End: 2023-07-26

## 2023-08-04 ENCOUNTER — OFFICE VISIT (OUTPATIENT)
Dept: DERMATOLOGY CLINIC | Facility: CLINIC | Age: 63
End: 2023-08-04

## 2023-08-04 DIAGNOSIS — D48.5 NEOPLASM OF UNCERTAIN BEHAVIOR OF SKIN: Primary | ICD-10-CM

## 2023-08-04 PROCEDURE — 11102 TANGNTL BX SKIN SINGLE LES: CPT | Performed by: PHYSICIAN ASSISTANT

## 2023-08-04 PROCEDURE — 88305 TISSUE EXAM BY PATHOLOGIST: CPT | Performed by: PHYSICIAN ASSISTANT

## 2023-08-04 PROCEDURE — 99213 OFFICE O/P EST LOW 20 MIN: CPT | Performed by: PHYSICIAN ASSISTANT

## 2023-08-04 NOTE — PROGRESS NOTES
HPI:    Patient ID: Kelsie Denis is a 65year old male. Patient presents for raised lesion on his right lower thigh for the past 5-6 months. Denies itching. Denies pain. Patient denies personal or family hx of skin cancer. No allergies to medications noted. Review of Systems   Constitutional:  Negative for chills and fever. Musculoskeletal:  Negative for arthralgias and myalgias. Skin:  Positive for rash. Negative for color change and wound. Current Outpatient Medications   Medication Sig Dispense Refill    aspirin 81 MG Oral Tab EC Take 1 tablet (81 mg total) by mouth daily with food. triamcinolone 0.1 % External Cream Apply 1 Application. topically 2 (two) times daily. To itchy red areas as needed 30 g 2    renal vitamin Oral Tab Take by mouth.      calcium acetate 667 MG Oral Cap TAKE ONE CAPSULE BY MOUTH FOUR TIMES DAILY WITH MEALS AND SNACKS      epoetin sammie 3000 UNIT/ML Injection Solution        Allergies:No Known Allergies   There were no vitals taken for this visit. There is no height or weight on file to calculate BMI. PHYSICAL EXAM:   Physical Exam  Constitutional:       General: He is not in acute distress. Appearance: Normal appearance. Skin:     General: Skin is warm and dry. Findings: Rash present. Comments: Pedunculated lesion noted on the right upper thigh. No draining or tenderness noted. About 3 mm in size. No scaling noted. No bleeding. Neurological:      Mental Status: He is alert and oriented to person, place, and time. ASSESSMENT/PLAN:   1. Neoplasm of uncertain behavior of skin  -After discussion with patient, advised the following:  -Performed shave biopsy  -Sent to pathology  -Will call with results.   -Will base treatment on results.   -Care instructions given. -To call or follow-up with worsening symptoms or concerns.   -Pt was agreeable to plan and will comply with discussion above.      - SURGICAL PATHOLOGY TISSUE;  Future      Orders Placed This Encounter      Specimen to Pathology Tissue      Meds This Visit:  Requested Prescriptions      No prescriptions requested or ordered in this encounter       Imaging & Referrals:  None         ID#[id number]

## 2023-08-04 NOTE — PROCEDURES
Procedure: Shave biopsy     With the patient is a supine position, the skin was scrubbed with alcohol. Anesthesia was obtained by injecting 2 mL of 1% Xylocaine with Epinephrine. The skin surrounding the lesion was placed under tension and the lesion on right upper thigh was incised using a #15 scalpel blade. The specimen was sent for histopathological examination. Hemostasis was obtained with cautery. The biopsy site was dressed with bandaid and petroleum jelly. The estimated blood loss was < 1mL. Clinical Dx & Size of lesion(s):    Lesion 1 - Skin tag- size: 3 mm    Darwin tolerated the procedure well.   Complications:  none

## 2023-08-07 NOTE — PROGRESS NOTES
Logged in path book and [facility]. LMTCB to discuss results and schedule with KMT in 2 months.

## 2023-08-14 NOTE — CONSULTS
[id number] [facility] Surgical Oncology and Breast Surgery    Patient Name:  Jarrett Monique   YOB: 1960   Gender:  Male   Appt Date:  8/15/2023   Provider:  Renato Avila MD   [facility]  [facility]     PATIENT PROVIDERS  Referring Provider: China Salguero PA-C    Primary Care Provider:Darwin Mayer MD   Address: [address] [place] [address]   Phone #: [phone]       CHIEF COMPLAINT  No chief complaint on file. PROBLEMS  Reviewed Patient Active Problem List:     Retention, urine     Bilateral hydronephrosis     Hypocalcemia     End stage renal disease ([facility]     Dependence on renal dialysis ([facility]       History of Present Illness:  Jarrett Monique is a 65year old Male with PMHx ESRD on HD who is being seen in the surgical oncology clinic to render an opinion regarding the surgical management of right upper thigh melanoma. Patient presented for evaluation of his right lower thigh lesion, which has been presented for 5-6 months. Denies itching, pain, bleeding. Shave biopsy performed 8/4/2023 demonstrates invasive melanoma, nodular type, Breslow thickness at least 1.8mm, no ulceration, deep and peripheral margin positive for invasive melanoma. Current stage at least pT2a. Patient presents to discuss surgical options. Denies personal or family history of skin cancer. Vital Signs: There were no vitals taken for this visit. Medications Reviewed:    Current Outpatient Medications:     aspirin 81 MG Oral Tab EC, Take 1 tablet (81 mg total) by mouth daily with food. , Disp: , Rfl:     triamcinolone 0.1 % External Cream, Apply 1 Application. topically 2 (two) times daily.  To itchy red areas as needed, Disp: 30 g, Rfl: 2    renal vitamin Oral Tab, Take by mouth., Disp: , Rfl:     calcium acetate 667 MG Oral Cap, TAKE ONE CAPSULE BY MOUTH FOUR TIMES DAILY WITH MEALS AND SNACKS, Disp: , Rfl:     epoetin sammie 3000 UNIT/ML Injection Solution, , Disp: , Rfl:      Allergies Reviewed:  No Known Allergies     History:  Reviewed:  Past Medical History:   Diagnosis Date    Bilateral hydronephrosis     Clostridium difficile infection     End stage renal disease (HCC)     Fibromyalgia     Melanoma ([facility] (Invasive 1.8mm) 08/2023    Right thigh      Reviewed:  Past Surgical History:   Procedure Laterality Date    OTHER      wisdom teeth x 2      Reviewed Social History:  Social History    Socioeconomic History      Marital status: Single    Tobacco Use      Smoking status: Never      Smokeless tobacco: Never    Vaping Use      Vaping Use: Never used    Substance and Sexual Activity      Alcohol use: Not Currently      Drug use: Not Currently    Other Topics      Concerns:        Reaction to local anesthetic: No        Pt has a pacemaker: No        Pt has a defibrillator: No     Reviewed:  Family History   Problem Relation Age of Onset    Heart Disorder Father     Lipids Father     Other (narcolepsy) Father     Other (cad) Father     Migraines Mother     Other (leukemia) Brother         Review of Systems:  Review of Systems   Constitutional:  Negative for activity change, appetite change, chills, fatigue, fever and unexpected weight change. HENT:  Negative for mouth sores, nosebleeds and trouble swallowing. Eyes:  Negative for discharge and redness. Respiratory:  Negative for cough, shortness of breath and wheezing. Cardiovascular:  Negative for chest pain. Gastrointestinal:  Negative for abdominal distention, abdominal pain, blood in stool, nausea and vomiting. Genitourinary:  Negative for difficulty urinating and dysuria. Musculoskeletal:  Negative for back pain and gait problem. Skin:  Negative for color change. Allergic/Immunologic: Negative for immunocompromised state. Neurological:  Negative for speech difficulty, weakness and numbness. Psychiatric/Behavioral:  Negative for confusion.          Physical Examination:  Physical Exam  Constitutional:       General: He is not in acute distress. Appearance: He is well-developed. He is not diaphoretic. HENT:      Head: Normocephalic and atraumatic. Eyes:      General:         Right eye: No discharge. Left eye: No discharge. Conjunctiva/sclera: Conjunctivae normal.      Pupils: Pupils are equal, round, and reactive to light. Neck:      Thyroid: No thyromegaly. Cardiovascular:      Rate and Rhythm: Normal rate and regular rhythm. Heart sounds: No murmur heard. Pulmonary:      Effort: No respiratory distress. Breath sounds: Normal breath sounds. No wheezing. Abdominal:      General: Bowel sounds are normal. There is no distension. Palpations: Abdomen is soft. Tenderness: There is no abdominal tenderness. Hernia: No hernia is present. Musculoskeletal:         General: Normal range of motion. Lymphadenopathy:      Cervical:      Right cervical: No superficial cervical adenopathy. Left cervical: No superficial cervical adenopathy. Upper Body:      Right upper body: No supraclavicular or axillary adenopathy. Left upper body: No supraclavicular or axillary adenopathy. Skin:     General: Skin is warm and dry. Neurological:      Mental Status: He is alert and oriented to person, place, and time. Document Review:  Pathology - 8/4/2023  Final Diagnosis:     Skin, upper right thigh, shave biopsy:  -Invasive melanoma (tumor thickness at least 1.8 mm). -See comment. Electronically signed by Layo Mi MD on 8/7/2023 at 10:28 AM        Final Diagnosis Comment      Sections show an atypical melanocytic proliferation formed of predominantly intradermal nests of melanocytes with abundant lightly pigmented cytoplasm and enlarged irregular nuclei with prominent nucleoli. Scattered mitotic figures are present. Few nests of cytologically similar melanocytes are present at the dermal-epidermal junction. The findings are consistent with invasive melanoma. Tumor thickness is difficult to measure due to the polypoid aspect of the lesion. The tumor involves the deep and peripheral margins. Given the minimal junctional component, a metastasis from a different site cannot be excluded. A re-excision is recommended. Synoptic Report     MELANOMA OF THE SKIN: Biopsy   8th Edition - Protocol posted: 3/23/2022MELANOMA OF THE SKIN: BIOPSY - All Specimens  SPECIMEN   Procedure  Biopsy, shave   Specimen Laterality  Right   TUMOR   Tumor Site  Skin of lower limb and hip: Upper thigh        Histologic Type  Nodular melanoma   Maximum Tumor (Breslow) Thickness (Millimeters)  At least: 1.8 mm     The tumor involves the deep margin   Ulceration  Not identified   Anatomic (Minesh) Level  At least level: IV   Mitotic Rate  10 mitoses per mm2   Microsatellite(s)  Not identified   Lymphovascular Invasion  Not identified   Neurotropism  Not identified   Tumor-Infiltrating Lymphocytes  Not identified   MARGINS     Margin Status for Invasive Melanoma  Invasive melanoma present at margin   Margin(s) Involved by Invasive Melanoma  Peripheral     Deep   Margin Status for Melanoma in situ  All margins negative for melanoma in situ   PATHOLOGIC STAGE CLASSIFICATION (pTNM, AJCC 8th Edition)     pT Category  pT2a             Assessment / Plan:  pT2a cN0 nodular invasive melanoma [group stage IB]  Went over diagnosis and recommended treatment. Recommend wide local excision with sentinel lymph node biopsy. Explained rationale and value of sentinel lymph node biopsy from prognostic standpoint and, to a lesser extent, melanoma specific survival.  Went over potential complications including and not limited to bleeding, infection, seroma formations and small risk of lymphedema. Patient had several questions, he requested that we rebiopsy that area prior to proceeding with surgery.   I explained this would unlikely  plan, slides have been reviewed by dermatopathology and, even in the event of a negative biopsy now, I would still recommend proceeding with treatment. He would like to obtain a second opinion which I encouraged him to do. He will let me know whether or not he wants to proceed with surgery. Gerardo Villagomez MD  [facility] Surgical Oncology  [facility]  Pager [phone]  [email or web address]        Follow Up:  No follow-ups on file.        Electronically Signed by: Sheela Brown PA-C

## 2023-08-15 ENCOUNTER — OFFICE VISIT (OUTPATIENT)
Dept: SURGERY | Facility: CLINIC | Age: 63
End: 2023-08-15
Payer: MEDICARE

## 2023-08-15 VITALS
SYSTOLIC BLOOD PRESSURE: 147 MMHG | OXYGEN SATURATION: 99 % | RESPIRATION RATE: 16 BRPM | HEART RATE: 73 BPM | BODY MASS INDEX: 27.19 KG/M2 | DIASTOLIC BLOOD PRESSURE: 89 MMHG | TEMPERATURE: 98 F | HEIGHT: 66 IN | WEIGHT: 169.19 LBS

## 2023-08-15 DIAGNOSIS — C43.70 MALIGNANT MELANOMA OF LOWER EXTREMITY, INCLUDING HIP, UNSPECIFIED LATERALITY (HCC): Primary | ICD-10-CM

## 2023-08-15 PROCEDURE — 3008F BODY MASS INDEX DOCD: CPT | Performed by: SURGERY

## 2023-08-15 PROCEDURE — 3079F DIAST BP 80-89 MM HG: CPT | Performed by: SURGERY

## 2023-08-15 PROCEDURE — 3077F SYST BP >= 140 MM HG: CPT | Performed by: SURGERY

## 2023-08-15 PROCEDURE — 99205 OFFICE O/P NEW HI 60 MIN: CPT | Performed by: SURGERY

## 2023-08-25 ENCOUNTER — TELEPHONE (OUTPATIENT)
Dept: SURGERY | Facility: CLINIC | Age: 63
End: 2023-08-25

## 2023-08-25 NOTE — TELEPHONE ENCOUNTER
LVM for patient. Checking if patient is ready to schedule surgery with  or is patient still getting other opinions. My name and phone number provided for a call back.

## 2023-08-30 ENCOUNTER — MED REC SCAN ONLY (OUTPATIENT)
Dept: DERMATOLOGY CLINIC | Facility: CLINIC | Age: 63
End: 2023-08-30

## 2023-09-08 ENCOUNTER — TELEPHONE (OUTPATIENT)
Dept: SURGERY | Facility: CLINIC | Age: 63
End: 2023-09-08

## 2023-09-08 NOTE — TELEPHONE ENCOUNTER
Patient called stating he has decided to have WLE of melanoma surgery at [facility]. Patient wants to thank Edith Lopez for his time and recommendations. Informed patient to call back if his decision changes, pt v/u. MD notified.

## 2023-10-03 ENCOUNTER — TELEPHONE (OUTPATIENT)
Dept: INTERVENTIONAL RADIOLOGY/VASCULAR | Facility: HOSPITAL | Age: 63
End: 2023-10-03

## 2023-10-03 VITALS — HEIGHT: 67 IN | WEIGHT: 169 LBS | BODY MASS INDEX: 26.53 KG/M2

## 2023-10-03 RX ORDER — SODIUM CHLORIDE 9 MG/ML
INJECTION, SOLUTION INTRAVENOUS CONTINUOUS
OUTPATIENT
Start: 2023-10-03

## 2023-10-10 ENCOUNTER — HOSPITAL ENCOUNTER (OUTPATIENT)
Dept: INTERVENTIONAL RADIOLOGY/VASCULAR | Facility: HOSPITAL | Age: 63
Discharge: HOME | End: 2023-10-10
Attending: NURSE PRACTITIONER
Payer: MEDICARE

## 2024-01-08 ENCOUNTER — TELEPHONE (OUTPATIENT)
Facility: CLINIC | Age: 64
End: 2024-01-08

## 2024-01-08 DIAGNOSIS — Z12.11 SCREEN FOR COLON CANCER: Primary | ICD-10-CM

## 2024-01-08 NOTE — TELEPHONE ENCOUNTER
Canceled for:  Colonoscopy 39320  Provider Name:  Dr Cruz  Date:  01/16/2024  Location:  [facility]  Sedation:  MAC  Time:  8:45am (Patient is aware arrival time is at 7:30am)  Prep:  Trilyte   Meds/Allergies Reconciled?:  Physician Reviewed  Diagnosis with codes:  Colon Screening Z12.11    In epic and endo per patient

## 2024-02-06 ENCOUNTER — MED REC SCAN ONLY (OUTPATIENT)
Dept: INTERNAL MEDICINE CLINIC | Facility: CLINIC | Age: 64
End: 2024-02-06

## 2024-05-09 ENCOUNTER — OFFICE VISIT (OUTPATIENT)
Dept: INTERNAL MEDICINE CLINIC | Facility: CLINIC | Age: 64
End: 2024-05-09
Payer: MEDICARE

## 2024-05-09 VITALS
WEIGHT: 172 LBS | HEIGHT: 67 IN | DIASTOLIC BLOOD PRESSURE: 76 MMHG | SYSTOLIC BLOOD PRESSURE: 112 MMHG | OXYGEN SATURATION: 98 % | BODY MASS INDEX: 27 KG/M2 | HEART RATE: 69 BPM

## 2024-05-09 DIAGNOSIS — K43.9 VENTRAL HERNIA WITHOUT OBSTRUCTION OR GANGRENE: ICD-10-CM

## 2024-05-09 DIAGNOSIS — G89.29 CHRONIC LEFT-SIDED LOW BACK PAIN WITHOUT SCIATICA: ICD-10-CM

## 2024-05-09 DIAGNOSIS — N18.6 END STAGE RENAL DISEASE (HCC): ICD-10-CM

## 2024-05-09 DIAGNOSIS — M54.50 CHRONIC LEFT-SIDED LOW BACK PAIN WITHOUT SCIATICA: ICD-10-CM

## 2024-05-09 DIAGNOSIS — K40.90 UNILATERAL INGUINAL HERNIA WITHOUT OBSTRUCTION OR GANGRENE, RECURRENCE NOT SPECIFIED: ICD-10-CM

## 2024-05-09 DIAGNOSIS — T83.89XA: ICD-10-CM

## 2024-05-09 DIAGNOSIS — R94.39 ABNORMAL CARDIOVASCULAR STRESS TEST: ICD-10-CM

## 2024-05-09 DIAGNOSIS — N13.8 BENIGN PROSTATIC HYPERPLASIA WITH URINARY OBSTRUCTION: Primary | ICD-10-CM

## 2024-05-09 DIAGNOSIS — N40.1 BENIGN PROSTATIC HYPERPLASIA WITH URINARY OBSTRUCTION: Primary | ICD-10-CM

## 2024-05-09 PROCEDURE — 3008F BODY MASS INDEX DOCD: CPT | Performed by: INTERNAL MEDICINE

## 2024-05-09 PROCEDURE — 3078F DIAST BP <80 MM HG: CPT | Performed by: INTERNAL MEDICINE

## 2024-05-09 PROCEDURE — 99204 OFFICE O/P NEW MOD 45 MIN: CPT | Performed by: INTERNAL MEDICINE

## 2024-05-09 PROCEDURE — 3074F SYST BP LT 130 MM HG: CPT | Performed by: INTERNAL MEDICINE

## 2024-05-09 RX ORDER — KETOCONAZOLE 20 MG/ML
SHAMPOO TOPICAL
COMMUNITY
Start: 2023-11-16

## 2024-05-09 NOTE — PROGRESS NOTES
Darwin HOPPER Oppenheimer male 63 year old    Here with his life partner Emily       Chief Complaint   Patient presents with    [facility]      To summarize his medical issues;    ESRD  - on dialysis for last  3 yrs  -he presented to the hospital during COVID with hydronephrosis and large bladder and large prostate.      Melenoma  -  knocked him off list  for transplant  -    has  imaging  ordered  since  DNA  test pos  -  lymph node neg    High psa  - last year  biopsy  neg  -( has had 2 bx neg  )      Had  stress test in  22 -  went 11 -  min   echo was  abnormal  for  LAD  -     Echo - atrial septal defect      Still with  sig  BPH  -  holep  being  looked into -  in case ever  candidate for  transplant - now  cath     Has 2 hernia      Patient Active Problem List   Diagnosis    Retention, urine    Bilateral hydronephrosis    Hypocalcemia    End stage renal disease ([facility])    Dependence on renal dialysis ([facility])    Abnormal cardiovascular stress test    Ventral hernia without obstruction or gangrene    Chronic left-sided low back pain without sciatica    Benign prostatic hyperplasia with urinary obstruction    Indwelling Martinez catheter calcification, initial encounter ([facility])          Current Outpatient Medications on File Prior to Visit   Medication Sig Dispense Refill    ketoconazole 2 % External Shampoo Apply 3 tsp to wet scalp, lather and leave on for 5 minutes before rinsing off. Use twice a week.      aspirin 81 MG Oral Tab EC Take 1 tablet (81 mg total) by mouth daily with food.      renal vitamin Oral Tab Take by mouth.      calcium acetate 667 MG Oral Cap TAKE ONE CAPSULE BY MOUTH FOUR TIMES DAILY WITH MEALS AND SNACKS      epoetin sammie 3000 UNIT/ML Injection Solution        No current facility-administered medications on file prior to visit.          Vitals:    05/09/24 1107   BP: 112/76   Pulse: 69   VITALSBody mass index is 26.94 kg/m².    Pertinent findings on the physical exam; he appears healthy.   Has a good affect no neurological deficits no JVD or bruit heart regular lungs clear abdomen revealed a ventral hernia and a right inguinal hernia.    Darwin was seen today for establish care.    Diagnoses and all orders for this visit:    Benign prostatic hyperplasia with urinary obstruction    Abnormal cardiovascular stress test  -     CARD ECHO STRESS ECHO/REST AND STRESS(CPT=93350/18976 [facility] [address]); Future    Ventral hernia without obstruction or gangrene  -     SURGERY - INTERNAL; Future    Unilateral inguinal hernia without obstruction or gangrene, recurrence not specified  -     SURGERY - INTERNAL; Future    Chronic left-sided low back pain without sciatica  -     Physical Therapy Referral - [facility]    End stage renal disease (HCC)    Indwelling Martinez catheter calcification, initial encounter ([facility])         We had a long discussion today about his medical condition.  Problems outlined as above.  Currently he is end-stage renal disease is being tolerated quite well.  He is currently not on a transplant list but would like to get on 1.  Melanoma complicated this.  If he was to get a transplant having an indwelling Martinez would put him at a higher risk of infection because of transplant medicines etc.  We discussed possible surgical options.  Currently has no symptoms of infection.    In reviewing his chart he has atrial septal defect with no symptoms he has no A-fib.  Of concern is a abnormal stress test however the fact that he went 11 minutes is probably a good prognostic sign I did recommend to do another stress test.    Will refer him to surgery for his hernias.    Refer to physical therapy for his chronic low back pain.            This note was prepared using [employer] voice recognition dictation software and as a result, errors may occur. When identified, these errors have been corrected. While every attempt is made to correct errors during dictation, discrepancies may still exist

## 2024-06-05 NOTE — PLAN OF CARE
I do not see an MRI ordered for the patient. What was an MRI ordered for?   Problem: Patient Centered Care  Goal: Patient preferences are identified and integrated in the patient's plan of care  Description: Interventions:  - What would you like us to know as we care for you?  I'm from home with girlfriend  - Provide timely, comp ordered  - Instruct patient on fluid and nutrition restrictions as appropriate  Outcome: Not Progressing  Goal: Hemodynamic stability and optimal renal function maintained  Description: INTERVENTIONS:  - Monitor labs and assess for signs and symptoms of vo Identify cognitive and physical deficits and behaviors that affect risk of falls.   - Roggen fall precautions as indicated by assessment.  - Educate pt/family on patient safety including physical limitations  - Instruct pt to call for assistance with act

## 2024-07-30 ENCOUNTER — PATIENT MESSAGE (OUTPATIENT)
Dept: INTERNAL MEDICINE CLINIC | Facility: CLINIC | Age: 64
End: 2024-07-30

## 2024-08-01 NOTE — TELEPHONE ENCOUNTER
Discussed his recent CAT scan at [facility] has some small pulmonary nodules.  He is scheduled to have a follow-up CAT scan in 3 months his liver lesion has been chronic and seems stable.  Has enlarged thyroid nodule is getting an ultrasound    Discussed prostate enlargement and options.    Blood pressure running high they are going to start him on amlodipine

## 2024-08-14 ENCOUNTER — OFFICE VISIT (OUTPATIENT)
Dept: INTERNAL MEDICINE CLINIC | Facility: CLINIC | Age: 64
End: 2024-08-14
Payer: MEDICARE

## 2024-08-14 VITALS
HEART RATE: 80 BPM | SYSTOLIC BLOOD PRESSURE: 136 MMHG | OXYGEN SATURATION: 97 % | DIASTOLIC BLOOD PRESSURE: 80 MMHG | BODY MASS INDEX: 27.31 KG/M2 | HEIGHT: 67 IN | WEIGHT: 174 LBS

## 2024-08-14 DIAGNOSIS — N18.6 END STAGE RENAL DISEASE (HCC): ICD-10-CM

## 2024-08-14 DIAGNOSIS — R94.39 ABNORMAL CARDIOVASCULAR STRESS TEST: ICD-10-CM

## 2024-08-14 DIAGNOSIS — E04.1 THYROID NODULE: ICD-10-CM

## 2024-08-14 DIAGNOSIS — N13.8 BENIGN PROSTATIC HYPERPLASIA WITH URINARY OBSTRUCTION: ICD-10-CM

## 2024-08-14 DIAGNOSIS — R03.0 ELEVATED BLOOD PRESSURE READING WITHOUT DIAGNOSIS OF HYPERTENSION: Primary | ICD-10-CM

## 2024-08-14 DIAGNOSIS — R91.8 MULTIPLE LUNG NODULES ON CT: ICD-10-CM

## 2024-08-14 DIAGNOSIS — K76.9 LIVER LESION: ICD-10-CM

## 2024-08-14 DIAGNOSIS — N40.1 BENIGN PROSTATIC HYPERPLASIA WITH URINARY OBSTRUCTION: ICD-10-CM

## 2024-08-14 PROCEDURE — 99214 OFFICE O/P EST MOD 30 MIN: CPT | Performed by: INTERNAL MEDICINE

## 2024-08-14 PROCEDURE — 3075F SYST BP GE 130 - 139MM HG: CPT | Performed by: INTERNAL MEDICINE

## 2024-08-14 PROCEDURE — 3079F DIAST BP 80-89 MM HG: CPT | Performed by: INTERNAL MEDICINE

## 2024-08-14 PROCEDURE — 3008F BODY MASS INDEX DOCD: CPT | Performed by: INTERNAL MEDICINE

## 2024-08-14 PROCEDURE — G2211 COMPLEX E/M VISIT ADD ON: HCPCS | Performed by: INTERNAL MEDICINE

## 2024-08-14 RX ORDER — SUCROFERRIC OXYHYDROXIDE 500 MG/1
1 TABLET, CHEWABLE ORAL
COMMUNITY
Start: 2024-08-06 | End: 2024-11-04

## 2024-08-14 NOTE — PROGRESS NOTES
Darwin HOPPER Oppenheimer male 63 year old         Chief Complaint   Patient presents with    Test Results     Bp running high at  dialysis  -  and they want to give him  amlodipine   his blood pressure cuff at home runs in the 1/31/1940 range.    We got  130  several times  -he denies any symptoms.  He still does make urine.    He had a recent CT scan for melanoma.  Had some abnormalities including some lung nodules and thyroid nodule  To get thyroid  ult  and  f/u  MRI  for  lungs nodules      Rediscussed  stress test  went 11 min but he had some significant concerns involving his LAD.  However  was  2 yrs ago-currently no chest pains or shortness of breath.      Patient Active Problem List   Diagnosis    Retention, urine    Bilateral hydronephrosis    Hypocalcemia    End stage renal disease ([facility])    Dependence on renal dialysis ([facility])    Abnormal cardiovascular stress test    Ventral hernia without obstruction or gangrene    Chronic left-sided low back pain without sciatica    Benign prostatic hyperplasia with urinary obstruction    Indwelling Martinez catheter calcification, initial encounter ([facility])          Current Outpatient Medications on File Prior to Visit   Medication Sig Dispense Refill    VELPHORO 500 MG Oral Chew Tab Chew 1 tablet by mouth.      ketoconazole 2 % External Shampoo Apply 3 tsp to wet scalp, lather and leave on for 5 minutes before rinsing off. Use twice a week.      aspirin 81 MG Oral Tab EC Take 1 tablet (81 mg total) by mouth daily with food.      renal vitamin Oral Tab Take by mouth.      calcium acetate 667 MG Oral Cap TAKE ONE CAPSULE BY MOUTH FOUR TIMES DAILY WITH MEALS AND SNACKS      epoetin sammie 3000 UNIT/ML Injection Solution        No current facility-administered medications on file prior to visit.          Vitals:    08/14/24 1230   BP: 136/80   Pulse: 80   VITALSBody mass index is 27.25 kg/m².    Pertinent findings on the physical exam; blood pressure checked twice and had him  palpate it with pulse.  Consistently in the low 130 range.    Darwin was seen today for test results.    Diagnoses and all orders for this visit:    Elevated blood pressure reading without diagnosis of hypertension    Abnormal cardiovascular stress test    End stage renal disease (HCC)    Benign prostatic hyperplasia with urinary obstruction    Thyroid nodule    Liver lesion    Multiple lung nodules on CT             Bp good  here  - need more data points  -   before make a decision about treatment.  Get stress test -for abnormal stress test 2 years ago    not on statin  -  now  has great numbers in past     To obtain thyroid imaging and then lung imaging for incidental findings for melanoma (liver and lung)) currently has no symptoms      This note was prepared using Dragon [employer] voice recognition dictation software and as a result, errors may occur. When identified, these errors have been corrected. While every attempt is made to correct errors during dictation, discrepancies may still exist

## 2024-08-22 ENCOUNTER — PATIENT MESSAGE (OUTPATIENT)
Dept: INTERNAL MEDICINE CLINIC | Facility: CLINIC | Age: 64
End: 2024-08-22

## 2024-08-23 NOTE — TELEPHONE ENCOUNTER
[facility] message requesting a complete fax number to [facility] to fax order.

## 2024-08-23 NOTE — TELEPHONE ENCOUNTER
From: Michael A Oppenheimer  To: Juancarlos Yeh  Sent: 8/22/2024 8:08 PM CDT  Subject: Colonoscopy Order    Hi Dr. Yeh,    I am looking at scheduling the stress test at [facility] and they are asking if you can fax over the order for the stress test to [phone].    Thank you!  Darwin

## 2024-09-05 ENCOUNTER — PATIENT MESSAGE (OUTPATIENT)
Dept: INTERNAL MEDICINE CLINIC | Facility: CLINIC | Age: 64
End: 2024-09-05

## 2024-11-05 ENCOUNTER — PATIENT MESSAGE (OUTPATIENT)
Dept: INTERNAL MEDICINE CLINIC | Facility: CLINIC | Age: 64
End: 2024-11-05

## 2025-02-03 ENCOUNTER — PATIENT MESSAGE (OUTPATIENT)
Dept: INTERNAL MEDICINE CLINIC | Facility: CLINIC | Age: 65
End: 2025-02-03

## 2025-02-03 DIAGNOSIS — S39.012A BACK STRAIN, INITIAL ENCOUNTER: ICD-10-CM

## 2025-02-03 DIAGNOSIS — R94.39 ABNORMAL CARDIOVASCULAR STRESS TEST: Primary | ICD-10-CM

## 2025-02-06 NOTE — TELEPHONE ENCOUNTER
Patient is going for surgery.  He had an abnormal stress test in 2022 with ischemic changes in anterior septal and inferior septal walls consistent with disease in his LAD.    Will order a lexicon stress test he has a bad back and cannot walk on a treadmill.

## 2025-02-06 NOTE — TELEPHONE ENCOUNTER
[facility] message sent to pt to inform him of Nuc Stress Test order placement by Dr Yeh. Informed pt  he can call central scheduling to see if appt here might be scheduled sooner than he was able to get be scheduled at [facility].

## 2025-02-10 ENCOUNTER — OFFICE VISIT (OUTPATIENT)
Dept: INTERNAL MEDICINE CLINIC | Facility: CLINIC | Age: 65
End: 2025-02-10
Payer: MEDICARE

## 2025-02-10 ENCOUNTER — LAB ENCOUNTER (OUTPATIENT)
Dept: LAB | Facility: HOSPITAL | Age: 65
End: 2025-02-10
Attending: INTERNAL MEDICINE
Payer: MEDICARE

## 2025-02-10 VITALS
WEIGHT: 179.81 LBS | HEIGHT: 67 IN | HEART RATE: 74 BPM | SYSTOLIC BLOOD PRESSURE: 130 MMHG | DIASTOLIC BLOOD PRESSURE: 72 MMHG | TEMPERATURE: 98 F | OXYGEN SATURATION: 97 % | BODY MASS INDEX: 28.22 KG/M2

## 2025-02-10 DIAGNOSIS — C43.9 MALIGNANT MELANOMA, UNSPECIFIED SITE (HCC): ICD-10-CM

## 2025-02-10 DIAGNOSIS — N40.1 BENIGN PROSTATIC HYPERPLASIA WITH URINARY OBSTRUCTION: ICD-10-CM

## 2025-02-10 DIAGNOSIS — E04.1 THYROID NODULE: ICD-10-CM

## 2025-02-10 DIAGNOSIS — N13.8 BENIGN PROSTATIC HYPERPLASIA WITH URINARY OBSTRUCTION: ICD-10-CM

## 2025-02-10 DIAGNOSIS — R97.20 ELEVATED PSA: ICD-10-CM

## 2025-02-10 DIAGNOSIS — N18.6 END STAGE RENAL DISEASE (HCC): ICD-10-CM

## 2025-02-10 DIAGNOSIS — R94.39 ABNORMAL CARDIOVASCULAR STRESS TEST: Primary | ICD-10-CM

## 2025-02-10 DIAGNOSIS — Q21.10 ASD (ATRIAL SEPTAL DEFECT) (HCC): ICD-10-CM

## 2025-02-10 LAB
ALBUMIN SERPL-MCNC: 4.6 G/DL (ref 3.2–4.8)
ALBUMIN/GLOB SERPL: 1.8 {RATIO} (ref 1–2)
ALP LIVER SERPL-CCNC: 53 U/L
ALT SERPL-CCNC: 17 U/L
ANION GAP SERPL CALC-SCNC: 14 MMOL/L (ref 0–18)
AST SERPL-CCNC: 28 U/L (ref ?–34)
BASOPHILS # BLD AUTO: 0.05 X10(3) UL (ref 0–0.2)
BASOPHILS NFR BLD AUTO: 0.9 %
BILIRUB SERPL-MCNC: 0.7 MG/DL (ref 0.2–1.1)
BUN BLD-MCNC: 75 MG/DL (ref 9–23)
BUN/CREAT SERPL: 9.5 (ref 10–20)
CALCIUM BLD-MCNC: 9.7 MG/DL (ref 8.7–10.4)
CHLORIDE SERPL-SCNC: 101 MMOL/L (ref 98–112)
CHOLEST SERPL-MCNC: 174 MG/DL (ref ?–200)
CO2 SERPL-SCNC: 26 MMOL/L (ref 21–32)
CREAT BLD-MCNC: 7.93 MG/DL
DEPRECATED RDW RBC AUTO: 45.2 FL (ref 35.1–46.3)
EGFRCR SERPLBLD CKD-EPI 2021: 7 ML/MIN/1.73M2 (ref 60–?)
EOSINOPHIL # BLD AUTO: 0.18 X10(3) UL (ref 0–0.7)
EOSINOPHIL NFR BLD AUTO: 3.3 %
ERYTHROCYTE [DISTWIDTH] IN BLOOD BY AUTOMATED COUNT: 13 % (ref 11–15)
FASTING PATIENT LIPID ANSWER: NO
FASTING STATUS PATIENT QL REPORTED: NO
GLOBULIN PLAS-MCNC: 2.6 G/DL (ref 2–3.5)
GLUCOSE BLD-MCNC: 86 MG/DL (ref 70–99)
HCT VFR BLD AUTO: 39.5 %
HDLC SERPL-MCNC: 46 MG/DL (ref 40–59)
HGB BLD-MCNC: 13.3 G/DL
IMM GRANULOCYTES # BLD AUTO: 0.02 X10(3) UL (ref 0–1)
IMM GRANULOCYTES NFR BLD: 0.4 %
LDLC SERPL CALC-MCNC: 112 MG/DL (ref ?–100)
LYMPHOCYTES # BLD AUTO: 0.99 X10(3) UL (ref 1–4)
LYMPHOCYTES NFR BLD AUTO: 18.2 %
MCH RBC QN AUTO: 32 PG (ref 26–34)
MCHC RBC AUTO-ENTMCNC: 33.7 G/DL (ref 31–37)
MCV RBC AUTO: 95 FL
MONOCYTES # BLD AUTO: 0.35 X10(3) UL (ref 0.1–1)
MONOCYTES NFR BLD AUTO: 6.4 %
NEUTROPHILS # BLD AUTO: 3.84 X10 (3) UL (ref 1.5–7.7)
NEUTROPHILS # BLD AUTO: 3.84 X10(3) UL (ref 1.5–7.7)
NEUTROPHILS NFR BLD AUTO: 70.8 %
NONHDLC SERPL-MCNC: 128 MG/DL (ref ?–130)
OSMOLALITY SERPL CALC.SUM OF ELEC: 314 MOSM/KG (ref 275–295)
PLATELET # BLD AUTO: 188 10(3)UL (ref 150–450)
POTASSIUM SERPL-SCNC: 4.1 MMOL/L (ref 3.5–5.1)
PROT SERPL-MCNC: 7.2 G/DL (ref 5.7–8.2)
RBC # BLD AUTO: 4.16 X10(6)UL
SODIUM SERPL-SCNC: 141 MMOL/L (ref 136–145)
T4 FREE SERPL-MCNC: 1.2 NG/DL (ref 0.8–1.7)
TRIGL SERPL-MCNC: 88 MG/DL (ref 30–149)
TSI SER-ACNC: 8.57 UIU/ML (ref 0.55–4.78)
VLDLC SERPL CALC-MCNC: 15 MG/DL (ref 0–30)
WBC # BLD AUTO: 5.4 X10(3) UL (ref 4–11)

## 2025-02-10 PROCEDURE — 80053 COMPREHEN METABOLIC PANEL: CPT

## 2025-02-10 PROCEDURE — 84439 ASSAY OF FREE THYROXINE: CPT

## 2025-02-10 PROCEDURE — 36415 COLL VENOUS BLD VENIPUNCTURE: CPT

## 2025-02-10 PROCEDURE — 80061 LIPID PANEL: CPT

## 2025-02-10 PROCEDURE — 85025 COMPLETE CBC W/AUTO DIFF WBC: CPT

## 2025-02-10 PROCEDURE — 99214 OFFICE O/P EST MOD 30 MIN: CPT | Performed by: INTERNAL MEDICINE

## 2025-02-10 PROCEDURE — G2211 COMPLEX E/M VISIT ADD ON: HCPCS | Performed by: INTERNAL MEDICINE

## 2025-02-10 PROCEDURE — 84443 ASSAY THYROID STIM HORMONE: CPT

## 2025-02-10 RX ORDER — AMLODIPINE BESYLATE 2.5 MG/1
5 TABLET ORAL DAILY
COMMUNITY
Start: 2024-08-06 | End: 2025-08-06

## 2025-02-10 RX ORDER — SUCROFERRIC OXYHYDROXIDE 500 MG/1
TABLET, CHEWABLE ORAL
COMMUNITY
Start: 2024-06-01

## 2025-02-10 RX ORDER — CHLORAL HYDRATE 500 MG
CAPSULE ORAL AS DIRECTED
COMMUNITY

## 2025-02-10 NOTE — PROGRESS NOTES
Darwin HOPPER Oppenheimer male 64 year old       Chief complaint: History of abnormal stress test, end-stage renal disease, thyroid nodule    Blas's going to have a thyroid surgery soon he had a thyroid nodule and the pathology revealed a tract mutation.    He is going to have a stress test since he had an abnormal finding several years ago.  Currently has no significant symptoms.  I was able to find old note from his previous cardiologist who recommended a CTA but that was never done.    He is currently going to dialysis at a new location.  Still using his Port-A-Cath.  Discussed that he should have a fistula placed.    He has been cleared by his general surgeon from any melanoma.    Still has indwelling Martinez has no UTI symptoms still making urine.    He had a prostate biopsy which was negative.    We also discussed previous ASD previous cardiologist said there was not much needed to be done.                                 Patient Active Problem List   Diagnosis    Retention, urine    Bilateral hydronephrosis    Hypocalcemia    End stage renal disease (HCC)    Dependence on renal dialysis    Abnormal cardiovascular stress test    Ventral hernia without obstruction or gangrene    Chronic left-sided low back pain without sciatica    Benign prostatic hyperplasia with urinary obstruction    Indwelling Martinez catheter calcification, initial encounter        Current Outpatient Medications   Medication Sig Dispense Refill    amLODIPine 2.5 MG Oral Tab Take 2 tablets (5 mg total) by mouth daily.      Omega-3 1000 MG Oral Cap Take by mouth As Directed.      VELPHORO 500 MG Oral Chew Tab       ketoconazole 2 % External Shampoo Apply 3 tsp to wet scalp, lather and leave on for 5 minutes before rinsing off. Use twice a week.      aspirin 81 MG Oral Tab EC Take 1 tablet (81 mg total) by mouth daily with food.      renal vitamin Oral Tab Take by mouth.      calcium acetate 667 MG Oral Cap TAKE ONE CAPSULE BY MOUTH FOUR TIMES  DAILY WITH MEALS AND SNACKS      epoetin sammie 3000 UNIT/ML Injection Solution  (Patient not taking: Reported on 2/10/2025)          Vitals:    02/10/25 1004   BP: 130/72   Pulse: 74   Temp: 97.9 °F (36.6 °C)   VITALSBody mass index is 28.16 kg/m².     General: Healthy looking ,  affect normal  neuro nl     Has thyroid nodule.  Heart  regular lungs are clear he has a Port-A-Cath on the right side of his chest.  abd has a ventral hernia in the epigastric area.  ext  no edema , no joint swelling        Darwin was seen today for physical.    Diagnoses and all orders for this visit:    Abnormal cardiovascular stress test    End stage renal disease (HCC)  -     VASCULAR SURGERY - INTERNAL  -     CBC With Differential With Platelet; Future  -     Lipid Panel; Future  -     Comp Metabolic Panel (14); Future    Thyroid nodule  -     TSH W Reflex To Free T4; Future    Malignant melanoma, unspecified site (HCC)    Benign prostatic hyperplasia with urinary obstruction    Elevated PSA    ASD (atrial septal defect) (HCC)  -     CARDIO - INTERNAL; Future       Discussed need for a fistula to be placed.  He states he is unable to do peritoneal dialysis because of his apartment size etc. refer to Dr. Holland    I ordered him a stress test.  He needs to do it with nuclear medicine since he hurt his back recently this is slowly improving.  Discussed Bengay etc.    His melanoma has been deemed cured.    He does have BPH.  Has indwelling catheter has no UTI symptoms.    He is doing well with his current dialysis location.  Nephrology check labs once a month.    He had elevated PSA with negative biopsy.      Cost his ASD and would like him to get another opinion from cardiology.    Like to check blood work on him.                                    This note was prepared using [employer] voice recognition dictation software and as a result, errors may occur. When identified, these errors have been corrected. While every attempt is  made to correct errors during dictation, discrepancies may still exist

## 2025-02-17 ENCOUNTER — HOSPITAL ENCOUNTER (OUTPATIENT)
Dept: NUCLEAR MEDICINE | Facility: HOSPITAL | Age: 65
Discharge: HOME | End: 2025-02-17
Attending: INTERNAL MEDICINE
Payer: MEDICARE

## 2025-02-17 ENCOUNTER — HOSPITAL ENCOUNTER (OUTPATIENT)
Dept: CV DIAGNOSTICS | Facility: HOSPITAL | Age: 65
Discharge: HOME | End: 2025-02-17
Attending: INTERNAL MEDICINE
Payer: MEDICARE

## 2025-02-17 DIAGNOSIS — R94.39 ABNORMAL CARDIOVASCULAR STRESS TEST: ICD-10-CM

## 2025-02-17 DIAGNOSIS — S39.012A BACK STRAIN, INITIAL ENCOUNTER: ICD-10-CM

## 2025-02-17 PROCEDURE — 78452 HT MUSCLE IMAGE SPECT MULT: CPT | Performed by: INTERNAL MEDICINE

## 2025-02-17 PROCEDURE — 93018 CV STRESS TEST I&R ONLY: CPT | Performed by: INTERNAL MEDICINE

## 2025-02-17 PROCEDURE — 93016 CV STRESS TEST SUPVJ ONLY: CPT | Performed by: INTERNAL MEDICINE

## 2025-02-17 PROCEDURE — 93017 CV STRESS TEST TRACING ONLY: CPT | Performed by: INTERNAL MEDICINE

## 2025-02-17 RX ORDER — REGADENOSON 0.08 MG/ML
INJECTION, SOLUTION INTRAVENOUS
Status: COMPLETED
Start: 2025-02-17 | End: 2025-02-17

## 2025-02-17 RX ADMIN — REGADENOSON 0.4 MG: 0.08 INJECTION, SOLUTION INTRAVENOUS at 08:55:00

## 2025-02-18 LAB
% OF MAX PREDICTED HR: 100 %
MAX DIASTOLIC BP: 90 MMHG
MAX HEART RATE: 98 BPM
MAX PREDICTED HEART RATE: 157 BPM
MAX SYSTOLIC BP: 155 MMHG
MAX WORK LOAD: 10

## 2025-02-22 ENCOUNTER — PATIENT MESSAGE (OUTPATIENT)
Age: 65
End: 2025-02-22

## 2025-02-25 NOTE — TELEPHONE ENCOUNTER
Attempted to contact patient unable to reach. Message left on voicemail to call and speak with the nurse. MyChart message sent to patient too.

## 2025-02-27 ENCOUNTER — NURSE TRIAGE (OUTPATIENT)
Dept: INTERNAL MEDICINE CLINIC | Facility: CLINIC | Age: 65
End: 2025-02-27

## 2025-02-27 RX ORDER — AMLODIPINE BESYLATE 2.5 MG/1
10 TABLET ORAL DAILY
Qty: 90 TABLET | Refills: 0 | Status: SHIPPED | OUTPATIENT
Start: 2025-02-27

## 2025-02-27 NOTE — TELEPHONE ENCOUNTER
Left a voice mail message for Dariwn to refer to [facility] message. RN sent [facility] message Dr. Yeh sent in a new prescription for Amlodipine taking 4 tabletes of 2.5 mg which is 10 mg once daily. RN also advises to follow-up with Dr. Loja for his opinion.

## 2025-02-27 NOTE — TELEPHONE ENCOUNTER
Spoke with Darwin who states he has persistent elevated blood pressures 140-150s/90s. Patient states the first blood pressure is prior to taking his Amlodipine but then the next few are throughout the day. Patient is inquiring if he needs lower blood pressure for his thyroid surgery?     Patient also inquiring about evaluation by Cardiologist since his cardiac tests are normal? RN informed Darwin his referral to Cardiologist is for evaluation with history of ASD. Darwin has an appointment Monday with Cardiologist, but he wanted to verify with Dr. Yeh he still needs this cardiac evaluation?      Please advise.    6:10am 156/96  Took 2    7:10am  149/97  9:00am 151/98  7:30pm. 154/99  10:00pm 150/93 Took 2      2/21  4:00am 153/101. Took 2   7:00am 144/94  2:00pm 139/96  7:15pm. 154/98 Took 2      5:28am. 159/103.   6:30am. 149/102.  Took 2   Reason for Disposition   Systolic BP >= 130 OR Diastolic >= 80, and is taking BP medications    Protocols used: Blood Pressure - High-A-OH

## 2025-02-28 NOTE — TELEPHONE ENCOUNTER
Spoke with Darwin Yeh has changed dosing of Amlodpiine 2.5 mg to 4 tablets ( total 10 mg) once daily. He also advised to discuss his blood pressure treatment with Dr. Loja his cardiologist. Patient voiced understanding and agreed to the plan.

## (undated) DIAGNOSIS — N19 RENAL FAILURE, UNSPECIFIED CHRONICITY: Primary | ICD-10-CM

## (undated) NOTE — LETTER
[address]  [facility] [address], [place], [place]  [id number]  INFORMED CONSENT FOR TRANSFUSION OF BLOOD OR BLOOD PRODUCTS  My physician has informed me of the nature, purpose, benefits and risks of transfusion for blood and blood components that ______________________________________________  (Signature of Patient)                                                            (Responsible party in case of Minor,

## (undated) NOTE — LETTER
[address], [place]  Authorization for Invasive Procedures  1.  I hereby authorize Dr. Krystal Rivera, my physician and whomever may be designated as the doctor's assistant, to perform the following operation and/or procedure:  Central line potential risks that can occur: fever and allergic reactions, hemolytic reactions, transmission of disease such as hepatitis, AIDS, cytomegalovirus (CMV), and flluid overload.  In the event that I wish to have autologous transfusions of my own blood, or a d judgment of my physician.      Signature of Patient:  ________________________________________________ Date: _________Time: _________    Responsible person in case of minor or unconscious: _____________________________Relationship: ____________     Witness

## (undated) NOTE — Clinical Note
SURII, HFU call made, see notes. NCM confirmed with patient that he has an appointment on 12/14/21 at 3:40 pm for a new patient exam. NCM changed visit type to HFU Non-TCM.

## (undated) NOTE — LETTER
Raghu Ceballos [address], [place], [address] [facility]  [id number]  INFORMED CONSENT FOR TRANSFUSION OF BLOOD OR BLOOD PRODUCTS  My physician has informed me of the nature, purpose, benefits and risks of transfusion for blood and blood components that ______________________________________________  (Signature of Patient)                                                            (Responsible party in case of Minor,

## (undated) NOTE — LETTER
[facility]  Administration of Anesthesia  1. I, Darwin Prescott, or _________________________________ acting on his behalf, (Patient) (Dependent/Representative) request to receive anesthesia for my pending procedure/operation/treatment spinal bleeding, seizure, cardiac arrest and death. 7. AWARENESS: I understand that it is possible (but unlikely) to have explicit memory of events from the operating room while under general anesthesia.   8. ELECTROCONVULSIVE THERAPY PATIENTS: This consen signature below affirms that prior to the time of the procedure, I have explained to the patient and/or his/her guardian, the risks and benefits of undergoing anesthesia, as well as any reasonable alternatives.     __________________________________________